# Patient Record
Sex: MALE | Race: WHITE | NOT HISPANIC OR LATINO | Employment: OTHER | URBAN - METROPOLITAN AREA
[De-identification: names, ages, dates, MRNs, and addresses within clinical notes are randomized per-mention and may not be internally consistent; named-entity substitution may affect disease eponyms.]

---

## 2023-12-29 ENCOUNTER — ANCILLARY PROCEDURE (OUTPATIENT)
Dept: RADIOLOGY | Facility: CLINIC | Age: 78
End: 2023-12-29
Payer: MEDICARE

## 2023-12-29 ENCOUNTER — OFFICE VISIT (OUTPATIENT)
Dept: PRIMARY CARE | Facility: CLINIC | Age: 78
End: 2023-12-29
Payer: MEDICARE

## 2023-12-29 VITALS
DIASTOLIC BLOOD PRESSURE: 78 MMHG | TEMPERATURE: 96.8 F | SYSTOLIC BLOOD PRESSURE: 119 MMHG | HEIGHT: 73 IN | WEIGHT: 197.2 LBS | BODY MASS INDEX: 26.14 KG/M2 | HEART RATE: 69 BPM

## 2023-12-29 DIAGNOSIS — H81.03 MENIERE'S DISEASE OF BOTH EARS: Primary | ICD-10-CM

## 2023-12-29 DIAGNOSIS — E78.2 MIXED HYPERLIPIDEMIA: ICD-10-CM

## 2023-12-29 DIAGNOSIS — L20.84 INTRINSIC ECZEMA: ICD-10-CM

## 2023-12-29 DIAGNOSIS — R73.9 HYPERGLYCEMIA: ICD-10-CM

## 2023-12-29 DIAGNOSIS — M25.511 RIGHT ANTERIOR SHOULDER PAIN: ICD-10-CM

## 2023-12-29 DIAGNOSIS — M25.511 ARTHRALGIA OF RIGHT ACROMIOCLAVICULAR JOINT: ICD-10-CM

## 2023-12-29 DIAGNOSIS — Z12.5 SCREENING FOR PROSTATE CANCER: ICD-10-CM

## 2023-12-29 PROBLEM — E78.5 HYPERLIPIDEMIA: Status: ACTIVE | Noted: 2023-12-29

## 2023-12-29 PROBLEM — C44.321 SQUAMOUS CELL CARCINOMA OF SKIN OF NOSE: Status: RESOLVED | Noted: 2023-05-25 | Resolved: 2023-12-29

## 2023-12-29 PROBLEM — G47.00 INSOMNIA: Status: ACTIVE | Noted: 2023-12-29

## 2023-12-29 PROBLEM — N40.0 BPH (BENIGN PROSTATIC HYPERPLASIA): Status: ACTIVE | Noted: 2023-12-29

## 2023-12-29 PROBLEM — L30.9 ECZEMA: Status: ACTIVE | Noted: 2023-12-29

## 2023-12-29 PROBLEM — G47.33 OSA (OBSTRUCTIVE SLEEP APNEA): Status: ACTIVE | Noted: 2023-12-29

## 2023-12-29 PROBLEM — I10 BENIGN ESSENTIAL HTN: Status: ACTIVE | Noted: 2023-12-29

## 2023-12-29 PROBLEM — I10 BENIGN ESSENTIAL HTN: Status: RESOLVED | Noted: 2023-12-29 | Resolved: 2023-12-29

## 2023-12-29 PROBLEM — B02.9 SHINGLES: Status: RESOLVED | Noted: 2023-12-29 | Resolved: 2023-12-29

## 2023-12-29 PROBLEM — K21.9 ESOPHAGEAL REFLUX: Status: ACTIVE | Noted: 2023-12-29

## 2023-12-29 PROBLEM — F41.8 DEPRESSION WITH ANXIETY: Status: ACTIVE | Noted: 2023-12-29

## 2023-12-29 PROBLEM — L57.0 ACTINIC KERATOSIS: Status: RESOLVED | Noted: 2023-05-25 | Resolved: 2023-12-29

## 2023-12-29 PROBLEM — L85.3 XEROSIS CUTIS: Status: RESOLVED | Noted: 2023-05-25 | Resolved: 2023-12-29

## 2023-12-29 PROBLEM — M88.9 PAGET'S DISEASE OF BONE: Status: RESOLVED | Noted: 2017-09-14 | Resolved: 2023-12-29

## 2023-12-29 PROCEDURE — 1126F AMNT PAIN NOTED NONE PRSNT: CPT | Performed by: INTERNAL MEDICINE

## 2023-12-29 PROCEDURE — 73030 X-RAY EXAM OF SHOULDER: CPT | Mod: RIGHT SIDE | Performed by: RADIOLOGY

## 2023-12-29 PROCEDURE — 1036F TOBACCO NON-USER: CPT | Performed by: INTERNAL MEDICINE

## 2023-12-29 PROCEDURE — 99214 OFFICE O/P EST MOD 30 MIN: CPT | Performed by: INTERNAL MEDICINE

## 2023-12-29 PROCEDURE — 1160F RVW MEDS BY RX/DR IN RCRD: CPT | Performed by: INTERNAL MEDICINE

## 2023-12-29 PROCEDURE — 1159F MED LIST DOCD IN RCRD: CPT | Performed by: INTERNAL MEDICINE

## 2023-12-29 PROCEDURE — 73030 X-RAY EXAM OF SHOULDER: CPT | Mod: RT

## 2023-12-29 RX ORDER — HYDROCHLOROTHIAZIDE 12.5 MG/1
12.5 TABLET ORAL DAILY
Qty: 90 TABLET | Refills: 3 | Status: SHIPPED | OUTPATIENT
Start: 2023-12-29 | End: 2024-03-22 | Stop reason: SDUPTHER

## 2023-12-29 ASSESSMENT — ENCOUNTER SYMPTOMS
DEPRESSION: 0
SHORTNESS OF BREATH: 0
FEVER: 0
COUGH: 0
PALPITATIONS: 0
OCCASIONAL FEELINGS OF UNSTEADINESS: 0
LOSS OF SENSATION IN FEET: 0
POLYDIPSIA: 0
CHILLS: 0

## 2023-12-29 NOTE — PATIENT INSTRUCTIONS
Follow up in 6 months for your annual physical.     Complete blood work 1 week before physical, it is ordered today for that future visit.

## 2023-12-29 NOTE — LETTER
January 27, 2024     Bryson Orourke  [address] [facility] [id number]      Dear Mr. Orourke:    Below are the results from your recent visit:    Based on the patient's shoulder x-ray, we see no signs of joint space arthritis.  Based on this finding and his location and type of symptoms at our visit I do believe that he is having some AC joint anterior shoulder pain.  As we discussed I could feel crepitus within the AC joint on examination at our appointment.  A good option for treating this if he wishes to avoid pill form medication as a topical OTC anti-inflammatory called Voltaren gel that you can apply directly over the area 2-4 times daily as needed.  If he does not mind pill medication, I can send in a long-acting anti-inflammatory called meloxicam for daily use for 2 to 4 weeks that would hopefully resolve the matter.    Resulted Orders   XR shoulder right 2+ views    Narrative    Interpreted By:  Harinder Cox,   STUDY:  XR SHOULDER RIGHT 2+ VIEWS;  12/29/2023 11:46 am      INDICATION:  Signs/Symptoms:anterior r shoulder pain. AC joint crepitus.      COMPARISON:  None.      ACCESSION NUMBER(S):  [id number]      ORDERING CLINICIAN:  JUAN DIEGO PARTIDA      FINDINGS:  No acute fracture or dislocation. Joint spaces appear preserved.        Impression    No acute osseous findings of the right shoulder.          Signed by: Harinder Cox 12/30/2023 11:36 AM  Dictation workstation:   [id number]       If you have any questions or concerns, please don't hesitate to call.         Sincerely,        Dr. Juan Diego Partida

## 2024-01-02 NOTE — RESULT ENCOUNTER NOTE
Based on the patient's shoulder x-ray, we see no signs of joint space arthritis.  Based on this finding and his location and type of symptoms at our visit I do believe that he is having some AC joint anterior shoulder pain.  As we discussed I could feel crepitus within the AC joint on examination at our appointment.  A good option for treating this if he wishes to avoid pill form medication as a topical OTC anti-inflammatory called Voltaren gel that you can apply directly over the area 2-4 times daily as needed.  If he does not mind pill medication, I can send in a long-acting anti-inflammatory called meloxicam for daily use for 2 to 4 weeks that would hopefully resolve the matter

## 2024-01-08 ENCOUNTER — OFFICE VISIT (OUTPATIENT)
Dept: DERMATOLOGY | Facility: CLINIC | Age: 79
End: 2024-01-08
Payer: MEDICARE

## 2024-01-08 DIAGNOSIS — D48.5 NEOPLASM OF UNCERTAIN BEHAVIOR OF SKIN: Primary | ICD-10-CM

## 2024-01-08 DIAGNOSIS — L57.8 DIFFUSE PHOTODAMAGE OF SKIN: ICD-10-CM

## 2024-01-08 DIAGNOSIS — D22.5 MELANOCYTIC NEVUS OF TRUNK: ICD-10-CM

## 2024-01-08 DIAGNOSIS — L82.0 INFLAMED SEBORRHEIC KERATOSIS: ICD-10-CM

## 2024-01-08 DIAGNOSIS — Z85.828 HISTORY OF NONMELANOMA SKIN CANCER: ICD-10-CM

## 2024-01-08 DIAGNOSIS — L82.1 SEBORRHEIC KERATOSIS: ICD-10-CM

## 2024-01-08 DIAGNOSIS — L57.0 ACTINIC KERATOSIS: ICD-10-CM

## 2024-01-08 DIAGNOSIS — L21.9 SEBORRHEIC DERMATITIS: ICD-10-CM

## 2024-01-08 PROCEDURE — 88305 TISSUE EXAM BY PATHOLOGIST: CPT | Performed by: DERMATOLOGY

## 2024-01-08 PROCEDURE — 17004 DESTROY PREMAL LESIONS 15/>: CPT | Performed by: DERMATOLOGY

## 2024-01-08 PROCEDURE — 11301 SHAVE SKIN LESION 0.6-1.0 CM: CPT | Performed by: DERMATOLOGY

## 2024-01-08 PROCEDURE — 1159F MED LIST DOCD IN RCRD: CPT | Performed by: DERMATOLOGY

## 2024-01-08 PROCEDURE — 88305 TISSUE EXAM BY PATHOLOGIST: CPT | Mod: TC,DER | Performed by: DERMATOLOGY

## 2024-01-08 PROCEDURE — 1036F TOBACCO NON-USER: CPT | Performed by: DERMATOLOGY

## 2024-01-08 PROCEDURE — 99214 OFFICE O/P EST MOD 30 MIN: CPT | Performed by: DERMATOLOGY

## 2024-01-08 PROCEDURE — 17110 DESTRUCTION B9 LES UP TO 14: CPT | Performed by: DERMATOLOGY

## 2024-01-08 PROCEDURE — 1126F AMNT PAIN NOTED NONE PRSNT: CPT | Performed by: DERMATOLOGY

## 2024-01-08 ASSESSMENT — DERMATOLOGY QUALITY OF LIFE (QOL) ASSESSMENT: ARE THERE EXCLUSIONS OR EXCEPTIONS FOR THE QUALITY OF LIFE ASSESSMENT: NO

## 2024-01-08 ASSESSMENT — DERMATOLOGY PATIENT ASSESSMENT
ARE YOU AN ORGAN TRANSPLANT RECIPIENT: NO
DO YOU USE SUNSCREEN: OCCASIONALLY
DO YOU USE A TANNING BED: NO
DO YOU HAVE ANY NEW OR CHANGING LESIONS: NO

## 2024-01-08 ASSESSMENT — ITCH NUMERIC RATING SCALE: HOW SEVERE IS YOUR ITCHING?: 0

## 2024-01-08 NOTE — PROGRESS NOTES
Subjective     Bryson Orourke is a 78 y.o. male who presents for the following: Skin Check.  He notes a raised, dry, scaly, itchy bump on his upper back, which has not changed in any other way, including in size or color, and does not hurt or bleed, but has been itching a lot recently.  He also notes very dry skin on his face.  He denies any other new, changing, or concerning skin lesions since his last visit; no bleeding, itching, or burning lesions.      Review of Systems:  No other skin or systemic complaints other than what is documented elsewhere in the note.    The following portions of the chart were reviewed this encounter and updated as appropriate:       Skin Cancer History  No skin cancer on file.    Specialty Problems          Dermatology Problems    Eczema       Past Dermatologic / Past Relevant Medical History:    - history of several nonmelanoma skin cancers, including most recently BCC on right lateral distal arm diagnosed on 10/4/21 s/p ED&C on 11/11/21  - numerous AKs s/p course of topical field therapy with Efudex 5% cream on his face and scalp completed in December 2019  - Tinea pedis  - allergic contact dermatitis to poison ivy with auto-eczematization  - no h/o melanoma    Family History:    Sister - nonmelanoma skin cancer  No family history of melanoma    Social History:    The patient works as a  and cabrera and draws on Fridays    Allergies:  Acyclovir, Diphenhydramine, Famciclovir, Penicillin, Penicillins, and Prednisone    Current Medications / CAM's:    Current Outpatient Medications:     hydroCHLOROthiazide (HYDRODiuril) 12.5 mg tablet, Take 1 tablet (12.5 mg) by mouth once daily., Disp: 90 tablet, Rfl: 3     Objective   Well appearing patient in no apparent distress; mood and affect are within normal limits.    A waist-up examination was performed including scalp, face, eyes, ears, nose, lips, neck, chest, axillae, abdomen, back, and bilateral upper extremities. All findings  within normal limits unless otherwise noted below.        Assessment/Plan   1. Neoplasm of uncertain behavior of skin  Right Lateral Dorsal Wrist  8 mm dark brown pigmented, asymmetric macule with an asymmetric pigment network and irregular borders           Shave removal    Lesion length (cm):  0.8  Margin per side (cm):  0  Lesion diameter (cm):  0.8  Informed consent: discussed and consent obtained    Timeout: patient name, date of birth, surgical site, and procedure verified    Procedure prep:  Patient was prepped and draped  Anesthesia: the lesion was anesthetized in a standard fashion    Anesthetic:  1% lidocaine w/ epinephrine 1-100,000 local infiltration  Instrument used: flexible razor blade    Hemostasis achieved with: aluminum chloride    Outcome: patient tolerated procedure well    Post-procedure details: sterile dressing applied and wound care instructions given    Dressing type: bandage and petrolatum      Specimen 1 - Dermatopathology- DERM LAB  Differential Diagnosis: lentigo v AMH v SCCIS  Check Margins Yes/No?:    Comments:    Dermpath Lab: Routine Histopathology (formalin-fixed tissue)    2. Inflamed seborrheic keratosis  Mid Back  On the patient's mid upper back, there is a 1 cm erythematous and light brown-colored, hyperkeratotic, stuck-on appearing papule with a surrounding rim of erythema    Inflamed Seborrheic Keratosis -mid upper back.  The benign nature of this lesion was discussed with the patient today and reassurance provided.  Given the history the patient provides of frequent irritation and associated symptoms as well as its inflamed appearance on exam today, I offered to treat this lesion with liquid nitrogen cryotherapy.  The patient expressed understanding, is in agreement with this plan, and wishes to proceed with cryotherapy today.    Destr of lesion - Mid Back  Complexity: simple    Destruction method: cryotherapy    Informed consent: discussed and consent obtained    Lesion  destroyed using liquid nitrogen: Yes    Cryotherapy cycles:  2  Outcome: patient tolerated procedure well with no complications    Post-procedure details: wound care instructions given      3. Actinic keratosis (20)  On the patient's left lateral neck, there is a pink, well-healed scar at the recent biopsy site with a surrounding rim of erythema, grittiness, and scale; scattered on the patient's left and right lateral neck and face, there are multiple erythematous, gritty, scaly macules    Biopsy-proven Actinic Keratosis and additional AKs - left lateral neck, present on the deep and peripheral margins, and scattered on left and right lateral neck and face, respectively.  The pre-cancerous nature of these lesions and treatment options were discussed with the patient today.  At this time, I recommend treatment with liquid nitrogen cryotherapy.  The patient expressed understanding, is in agreement with this plan, and wishes to proceed with cryotherapy today.    Destr of lesion  Complexity: simple    Destruction method: cryotherapy    Informed consent: discussed and consent obtained    Lesion destroyed using liquid nitrogen: Yes    Cryotherapy cycles:  1  Outcome: patient tolerated procedure well with no complications    Post-procedure details: wound care instructions given      4. Melanocytic nevus of trunk  Scattered on the patient's face, neck, trunk, and bilateral upper extremities, there are several small, round- to oval-shaped, brown-pigmented and pink-colored, symmetric, uniform-appearing macules and dome-shaped papules    Clinically benign- to slightly atypical-appearing nevi - the clinically benign- to slightly atypical-appearing nature of the remainder of the patient's nevi was discussed with the patient today.  None of the patient's nevi, with the exception of the one noted above, meet threshold for biopsy today.  I emphasized the importance of performing monthly self-skin exams using the ABCDs of monitoring  moles, which were reviewed with the patient today and an informational hand-out provided.  I also emphasized the importance of sun avoidance and sun protection with daily sunscreen use.    5. Seborrheic keratosis  Scattered on the patient's face, neck, trunk, and bilateral upper extremities, there are multiple tan- to light brown-colored, hyperkeratotic, stuck-on appearing papules of varying size and shape    Seborrheic Keratoses - the benign nature of these lesions was discussed with the patient today and reassurance provided.  No treatment is medically indicated for the noninflamed SKs at this time.    6. Seborrheic dermatitis  Head - Anterior (Face)  On the patient's face, mainly the glabella and bilateral eyebrows and perinasal creases, there are pink, scaly patches with whitish-yellowish, greasy scale    Seborrheic Dermatitis - flare on face.  The potentially chronic and intermittently flaring nature of this condition and treatment options were discussed extensively with the patient today.  At this time, I recommend topical anti-fungal therapy with Ketoconazole 2% cream, which the patient was instructed to apply twice daily to the affected areas of the face.  The risks, benefits, and side effects of this medication were discussed.  The patient expressed understanding and is in agreement with this plan.    7. History of nonmelanoma skin cancer  On the patient's right lateral distal arm, right lateral cheek posterior, left posterior temple, right upper forehead, and right upper cheek, there are well-healed scars with no evidence of recurrent growth on exam today.    History of nonmelanoma skin cancers and actinic keratoses and photodamage.  There is no evidence of recurrence at the sites of the patient's previously treated NMSCs on exam today.  The signs and symptoms of skin cancer were reviewed and the patient was advised to practice sun protection and sun avoidance, use daily sunscreen, and perform regular self  skin exams.  I will have the patient return to our office in 3 to 4 months, pending the above biopsy result, for routine follow-up and skin exam, and the patient was instructed to call our office should the patient notice any new, changing, symptomatic, or otherwise concerning skin lesions before then.  The patient expressed understanding and is in agreement with this plan.    8. Diffuse photodamage of skin  Photodistributed  Diffuse photodamage with actinic changes with telangiectasia and mottled pigmentation in sun-exposed areas.    Photodamage.  The signs and symptoms of skin cancer were reviewed and the patient was advised to practice sun protection and sun avoidance, use daily sunscreen, and perform regular self skin exams.  Sun protection was discussed, including avoiding the mid-day sun, wearing a sunscreen with SPF at least 50, and stressing the need for reapplication of sunscreen and applying more than they think they need.

## 2024-01-10 LAB
LABORATORY COMMENT REPORT: NORMAL
PATH REPORT.FINAL DX SPEC: NORMAL
PATH REPORT.GROSS SPEC: NORMAL
PATH REPORT.MICROSCOPIC SPEC OTHER STN: NORMAL
PATH REPORT.RELEVANT HX SPEC: NORMAL
PATH REPORT.TOTAL CANCER: NORMAL

## 2024-01-27 ENCOUNTER — TELEPHONE (OUTPATIENT)
Dept: PRIMARY CARE | Facility: CLINIC | Age: 79
End: 2024-01-27

## 2024-03-22 ENCOUNTER — OFFICE VISIT (OUTPATIENT)
Dept: PRIMARY CARE | Facility: CLINIC | Age: 79
End: 2024-03-22
Payer: MEDICARE

## 2024-03-22 VITALS
DIASTOLIC BLOOD PRESSURE: 74 MMHG | TEMPERATURE: 96.8 F | HEART RATE: 76 BPM | WEIGHT: 195 LBS | BODY MASS INDEX: 25.73 KG/M2 | SYSTOLIC BLOOD PRESSURE: 132 MMHG

## 2024-03-22 DIAGNOSIS — E78.5 DYSLIPIDEMIA: ICD-10-CM

## 2024-03-22 DIAGNOSIS — M15.9 PRIMARY OSTEOARTHRITIS INVOLVING MULTIPLE JOINTS: ICD-10-CM

## 2024-03-22 DIAGNOSIS — H81.03 MENIERE'S DISEASE OF BOTH EARS: Primary | ICD-10-CM

## 2024-03-22 DIAGNOSIS — M77.8 RIGHT SHOULDER TENDONITIS: ICD-10-CM

## 2024-03-22 DIAGNOSIS — G25.0 BENIGN ESSENTIAL TREMOR: ICD-10-CM

## 2024-03-22 PROCEDURE — G2211 COMPLEX E/M VISIT ADD ON: HCPCS | Performed by: INTERNAL MEDICINE

## 2024-03-22 PROCEDURE — 1160F RVW MEDS BY RX/DR IN RCRD: CPT | Performed by: INTERNAL MEDICINE

## 2024-03-22 PROCEDURE — 99214 OFFICE O/P EST MOD 30 MIN: CPT | Performed by: INTERNAL MEDICINE

## 2024-03-22 PROCEDURE — 1036F TOBACCO NON-USER: CPT | Performed by: INTERNAL MEDICINE

## 2024-03-22 PROCEDURE — 1159F MED LIST DOCD IN RCRD: CPT | Performed by: INTERNAL MEDICINE

## 2024-03-22 RX ORDER — HYDROCHLOROTHIAZIDE 12.5 MG/1
12.5 TABLET ORAL DAILY
Qty: 100 TABLET | Refills: 3 | Status: SHIPPED | OUTPATIENT
Start: 2024-03-22

## 2024-03-22 RX ORDER — ATORVASTATIN CALCIUM 10 MG/1
10 TABLET, FILM COATED ORAL DAILY
Qty: 100 TABLET | Refills: 3 | Status: SHIPPED | OUTPATIENT
Start: 2024-03-22 | End: 2025-04-26

## 2024-03-22 ASSESSMENT — ENCOUNTER SYMPTOMS
SHORTNESS OF BREATH: 0
COUGH: 0
PALPITATIONS: 0
CHILLS: 0
POLYDIPSIA: 0
FEVER: 0

## 2024-03-22 NOTE — PROGRESS NOTES
Subjective   Patient ID: Bryson Orourke is a 79 y.o. male who presents for No chief complaint on file..    79-year-old male presents for follow-up on multiple issues.  He scheduled this appointment recently after a home check-in from his insurance company and the nurse.  At that time they discovered he was not taking any of his medications as previously recommended he had a mildly high blood pressure and it was noted that he has high cholesterol as we have previously discussed.  The patient has been and team medication for some time having taken himself off medication and never actually starting the hydrochlorothiazide as we previously discussed.  He was again educated today about the benefits of these medications for managing his variable blood pressures, chronic Ménière's disease, and chronic elevated lipids with a cholesterol ratio of 5.0.  At the conclusion of this discussion the patient was agreeable to initiating medications as previously recommended.  He is bothered by the Ménière's disease by the presence of chronic tinnitus as we discussed today that it may be of benefit to use this medication for that management.  I have advised him to complete blood work a few weeks after starting all medications and to see me back at his usual physical as it is currently scheduled.  He can contact me sooner on an as-needed basis should the need arise.    In addition to the above, he reports some chronic shoulder pain on the right side with a negative x-ray back in December.  I advised him about the recommendation for physical therapy which was ordered and provided to him today.  He also has diffuse multijoint arthritis which was originally suspicion for his shoulders for improvement being negative.  Primarily the lower extremities especially in the knee.  This makes it difficult for him to exercise with weightbearing exercise but is requesting to consider water-based physical therapy which I provided him information and  orders for at this time.         Review of Systems   Constitutional:  Negative for chills and fever.   Respiratory:  Negative for cough and shortness of breath.    Cardiovascular:  Negative for chest pain and palpitations.   Endocrine: Negative for polydipsia and polyuria.       Objective   /74   Pulse 76   Temp 36 °C (96.8 °F)   Wt 88.5 kg (195 lb)   BMI 25.73 kg/m²     Physical Exam  Constitutional:       Appearance: Normal appearance.   HENT:      Head: Normocephalic and atraumatic.   Eyes:      Extraocular Movements: Extraocular movements intact.      Pupils: Pupils are equal, round, and reactive to light.   Neck:      Thyroid: No thyroid mass or thyromegaly.      Vascular: No carotid bruit.   Cardiovascular:      Rate and Rhythm: Normal rate and regular rhythm.      Heart sounds: No murmur heard.     No friction rub. No gallop.   Pulmonary:      Effort: No respiratory distress.      Breath sounds: No wheezing, rhonchi or rales.   Musculoskeletal:      Cervical back: Neck supple.      Right lower leg: No edema.      Left lower leg: No edema.   Lymphadenopathy:      Cervical: No cervical adenopathy.   Neurological:      Mental Status: He is alert.         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

## 2024-03-25 ENCOUNTER — APPOINTMENT (OUTPATIENT)
Dept: PRIMARY CARE | Facility: CLINIC | Age: 79
End: 2024-03-25
Payer: MEDICARE
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
## 2024-04-24 ENCOUNTER — EVALUATION (OUTPATIENT)
Dept: PHYSICAL THERAPY | Facility: CLINIC | Age: 79
End: 2024-04-24
Payer: MEDICARE

## 2024-04-24 DIAGNOSIS — G89.29 CHRONIC RIGHT SHOULDER PAIN: Primary | ICD-10-CM

## 2024-04-24 DIAGNOSIS — M54.2 NECK PAIN: ICD-10-CM

## 2024-04-24 DIAGNOSIS — M15.9 PRIMARY OSTEOARTHRITIS INVOLVING MULTIPLE JOINTS: ICD-10-CM

## 2024-04-24 DIAGNOSIS — M25.511 CHRONIC RIGHT SHOULDER PAIN: Primary | ICD-10-CM

## 2024-04-24 PROBLEM — M15.0 PRIMARY OSTEOARTHRITIS INVOLVING MULTIPLE JOINTS: Status: ACTIVE | Noted: 2024-04-24

## 2024-04-24 PROCEDURE — 97110 THERAPEUTIC EXERCISES: CPT | Mod: GP | Performed by: PHYSICAL THERAPIST

## 2024-04-24 PROCEDURE — 97161 PT EVAL LOW COMPLEX 20 MIN: CPT | Mod: GP | Performed by: PHYSICAL THERAPIST

## 2024-04-24 ASSESSMENT — ENCOUNTER SYMPTOMS
DEPRESSION: 0
LOSS OF SENSATION IN FEET: 0
OCCASIONAL FEELINGS OF UNSTEADINESS: 0

## 2024-04-29 ENCOUNTER — LAB (OUTPATIENT)
Dept: LAB | Facility: LAB | Age: 79
End: 2024-04-29
Payer: MEDICARE

## 2024-04-29 DIAGNOSIS — R73.9 HYPERGLYCEMIA: ICD-10-CM

## 2024-04-29 DIAGNOSIS — Z12.5 SCREENING FOR PROSTATE CANCER: ICD-10-CM

## 2024-04-29 DIAGNOSIS — H81.03 MENIERE'S DISEASE OF BOTH EARS: ICD-10-CM

## 2024-04-29 DIAGNOSIS — E78.2 MIXED HYPERLIPIDEMIA: ICD-10-CM

## 2024-04-29 LAB
ALBUMIN SERPL BCP-MCNC: 4.2 G/DL (ref 3.4–5)
ALP SERPL-CCNC: 98 U/L (ref 33–136)
ALT SERPL W P-5'-P-CCNC: 18 U/L (ref 10–52)
ANION GAP SERPL CALC-SCNC: 12 MMOL/L (ref 10–20)
AST SERPL W P-5'-P-CCNC: 18 U/L (ref 9–39)
BILIRUB SERPL-MCNC: 0.4 MG/DL (ref 0–1.2)
BUN SERPL-MCNC: 18 MG/DL (ref 6–23)
CALCIUM SERPL-MCNC: 9.4 MG/DL (ref 8.6–10.6)
CHLORIDE SERPL-SCNC: 102 MMOL/L (ref 98–107)
CHOLEST SERPL-MCNC: 163 MG/DL (ref 0–199)
CHOLESTEROL/HDL RATIO: 3.5
CO2 SERPL-SCNC: 31 MMOL/L (ref 21–32)
CREAT SERPL-MCNC: 1.06 MG/DL (ref 0.5–1.3)
CREAT UR-MCNC: 127.3 MG/DL (ref 20–370)
EGFRCR SERPLBLD CKD-EPI 2021: 72 ML/MIN/1.73M*2
ERYTHROCYTE [DISTWIDTH] IN BLOOD BY AUTOMATED COUNT: 12.1 % (ref 11.5–14.5)
EST. AVERAGE GLUCOSE BLD GHB EST-MCNC: 105 MG/DL
GLUCOSE SERPL-MCNC: 90 MG/DL (ref 74–99)
HBA1C MFR BLD: 5.3 %
HCT VFR BLD AUTO: 40.1 % (ref 41–52)
HDLC SERPL-MCNC: 46 MG/DL
HGB BLD-MCNC: 13.6 G/DL (ref 13.5–17.5)
LDLC SERPL CALC-MCNC: 100 MG/DL
MCH RBC QN AUTO: 30.8 PG (ref 26–34)
MCHC RBC AUTO-ENTMCNC: 33.9 G/DL (ref 32–36)
MCV RBC AUTO: 91 FL (ref 80–100)
MICROALBUMIN UR-MCNC: <7 MG/L
MICROALBUMIN/CREAT UR: NORMAL MG/G{CREAT}
NON HDL CHOLESTEROL: 117 MG/DL (ref 0–149)
NRBC BLD-RTO: 0 /100 WBCS (ref 0–0)
PLATELET # BLD AUTO: 279 X10*3/UL (ref 150–450)
POTASSIUM SERPL-SCNC: 4.3 MMOL/L (ref 3.5–5.3)
PROT SERPL-MCNC: 7.2 G/DL (ref 6.4–8.2)
PSA SERPL-MCNC: 1.78 NG/ML
RBC # BLD AUTO: 4.42 X10*6/UL (ref 4.5–5.9)
SODIUM SERPL-SCNC: 141 MMOL/L (ref 136–145)
TRIGL SERPL-MCNC: 86 MG/DL (ref 0–149)
VLDL: 17 MG/DL (ref 0–40)
WBC # BLD AUTO: 6.4 X10*3/UL (ref 4.4–11.3)

## 2024-04-29 PROCEDURE — 80061 LIPID PANEL: CPT

## 2024-04-29 PROCEDURE — 36415 COLL VENOUS BLD VENIPUNCTURE: CPT

## 2024-04-29 PROCEDURE — 82043 UR ALBUMIN QUANTITATIVE: CPT

## 2024-04-29 PROCEDURE — G0103 PSA SCREENING: HCPCS

## 2024-04-29 PROCEDURE — 83036 HEMOGLOBIN GLYCOSYLATED A1C: CPT

## 2024-04-29 PROCEDURE — 82570 ASSAY OF URINE CREATININE: CPT

## 2024-04-29 PROCEDURE — 85027 COMPLETE CBC AUTOMATED: CPT

## 2024-04-29 PROCEDURE — 80053 COMPREHEN METABOLIC PANEL: CPT

## 2024-05-01 ENCOUNTER — TREATMENT (OUTPATIENT)
Dept: PHYSICAL THERAPY | Facility: CLINIC | Age: 79
End: 2024-05-01
Payer: MEDICARE

## 2024-05-01 DIAGNOSIS — M15.9 PRIMARY OSTEOARTHRITIS INVOLVING MULTIPLE JOINTS: Primary | ICD-10-CM

## 2024-05-01 DIAGNOSIS — G89.29 CHRONIC RIGHT SHOULDER PAIN: ICD-10-CM

## 2024-05-01 DIAGNOSIS — M54.2 NECK PAIN: ICD-10-CM

## 2024-05-01 DIAGNOSIS — M25.511 CHRONIC RIGHT SHOULDER PAIN: ICD-10-CM

## 2024-05-01 PROCEDURE — 97110 THERAPEUTIC EXERCISES: CPT | Mod: GP | Performed by: PHYSICAL THERAPIST

## 2024-05-01 NOTE — PROGRESS NOTES
"Physical Therapy  Physical Therapy Treatment    Patient Name: Bryson Orourke  MRN: [id number]  Today's Date: 5/1/2024  Time Calculation  Start Time: 1130  Stop Time: 1210  Time Calculation (min): 40 min  Visit 2 Reason for visit: Chronic right shoulder pain, OA multiple sites   Referring MD: Juan Diego Jung  Insurance: [facility] medicare, [place], $20 co pay, no auth  DX: oa multiple sites, chronic right shd pain, neck pain   Frequency: 1 /week   Duration: 6-8 weeks  Certification Period Start Date: 04/24/24  Certification Period End Date: 07/23/24      Current Problem  1. Primary osteoarthritis involving multiple joints        2. Chronic right shoulder pain        3. Neck pain            General  Reason for Referral: right shoulder pain, neck pain  Referred By: Juan Diego Partida  Past Medical History Relevant to Rehab: Meniere's, HLD, hearing trouble,  Precautions     Pain       Subjective:   Subjective   Patient reports I think I ache a little more neck and shoulder 5/10.  I have been doing my exercises and maybe I am doing them too hard.      Objective:   Objective   Observation/Posture: fwd head min rounded shoulders     Treatments:  UBE L3 x5'   Seated Cervical Retraction 5\" x10   Supine retraction with towel roll 5\" x10   Cervical Rotation AROM 5\"x10   Cervical Sidebending AROM 5\"x10   Cane flexion 5\" x15  Cane abd 5\" x15   Cane ext 5\" x15   Scap sets   Rows GTB x20 (Added to HEP)    JULIETTE shd extension GTB 5\" x20 (Added to HEP)          Access Code: [email or web address] shoulder neck   Access Code: [email or web address] at home foot / toe   Assessment:    Patient reported better tolerance to exercises with less discomfort if not pushing too far.  One episode of right shoulder pain with cane abduction, tolerated scaption better.       Plan:    Continue per POC to increase ROM, strength and stability neck, shoulder, and scapula to allow return to pain free adl's.        Paul Mahajan, PT  "

## 2024-05-09 ENCOUNTER — OFFICE VISIT (OUTPATIENT)
Dept: DERMATOLOGY | Facility: CLINIC | Age: 79
End: 2024-05-09
Payer: MEDICARE

## 2024-05-09 DIAGNOSIS — D18.01 HEMANGIOMA OF SKIN: ICD-10-CM

## 2024-05-09 DIAGNOSIS — L82.1 SEBORRHEIC KERATOSIS: ICD-10-CM

## 2024-05-09 DIAGNOSIS — L57.0 ACTINIC KERATOSIS: ICD-10-CM

## 2024-05-09 DIAGNOSIS — D48.5 NEOPLASM OF UNCERTAIN BEHAVIOR OF SKIN: Primary | ICD-10-CM

## 2024-05-09 DIAGNOSIS — Z85.828 HISTORY OF NONMELANOMA SKIN CANCER: ICD-10-CM

## 2024-05-09 DIAGNOSIS — L73.9 FOLLICULITIS: ICD-10-CM

## 2024-05-09 DIAGNOSIS — L57.8 DIFFUSE PHOTODAMAGE OF SKIN: ICD-10-CM

## 2024-05-09 DIAGNOSIS — D22.5 MELANOCYTIC NEVUS OF TRUNK: ICD-10-CM

## 2024-05-09 PROCEDURE — 17004 DESTROY PREMAL LESIONS 15/>: CPT | Performed by: DERMATOLOGY

## 2024-05-09 PROCEDURE — 11307 SHAVE SKIN LESION 1.1-2.0 CM: CPT | Performed by: DERMATOLOGY

## 2024-05-09 PROCEDURE — 1159F MED LIST DOCD IN RCRD: CPT | Performed by: DERMATOLOGY

## 2024-05-09 PROCEDURE — 99214 OFFICE O/P EST MOD 30 MIN: CPT | Performed by: DERMATOLOGY

## 2024-05-09 PROCEDURE — 1160F RVW MEDS BY RX/DR IN RCRD: CPT | Performed by: DERMATOLOGY

## 2024-05-09 ASSESSMENT — DERMATOLOGY QUALITY OF LIFE (QOL) ASSESSMENT: ARE THERE EXCLUSIONS OR EXCEPTIONS FOR THE QUALITY OF LIFE ASSESSMENT: NO

## 2024-05-09 ASSESSMENT — DERMATOLOGY PATIENT ASSESSMENT
DO YOU USE A TANNING BED: NO
DO YOU USE SUNSCREEN: OCCASIONALLY
DO YOU HAVE ANY NEW OR CHANGING LESIONS: NO

## 2024-05-09 NOTE — PROGRESS NOTES
Subjective     Bryson Orourke is a 79 y.o. male who presents for the following: Skin Exam.  He notes a new scaly bump on his left hand, which has been hurting recently.  He also notes occasional pimple breakouts on his neck.  He denies any other new, changing, or concerning skin lesions since his last visit; no bleeding, itching, or burning lesions.      Review of Systems:  No other skin or systemic complaints other than what is documented elsewhere in the note.    The following portions of the chart were reviewed this encounter and updated as appropriate:       Skin Cancer History  No skin cancer on file.    Specialty Problems          Dermatology Problems    Eczema       Past Dermatologic / Past Relevant Medical History:    - history of several nonmelanoma skin cancers, including most recently BCC on right lateral distal arm diagnosed on 10/4/21 s/p ED&C on 11/11/21  - numerous AKs s/p course of topical field therapy with Efudex 5% cream on his face and scalp completed in December 2019  - Tinea pedis  - allergic contact dermatitis to poison ivy with auto-eczematization  - seborrheic dermatitis  - no h/o melanoma    Family History:    Sister - nonmelanoma skin cancer  No family history of melanoma    Social History:    The patient works as a  and cabrera and draws on Fridays    Allergies:  Acyclovir, Diphenhydramine, Famciclovir, Penicillin, Penicillins, and Prednisone    Current Medications / CAM's:    Current Outpatient Medications:     atorvastatin (Lipitor) 10 mg tablet, Take 1 tablet (10 mg) by mouth once daily., Disp: 100 tablet, Rfl: 3    hydroCHLOROthiazide (Microzide) 12.5 mg tablet, Take 1 tablet (12.5 mg) by mouth once daily., Disp: 100 tablet, Rfl: 3     Objective   Well appearing patient in no apparent distress; mood and affect are within normal limits.    A full examination was performed including scalp, face, eyes, ears, nose, lips, neck, chest, axillae, abdomen, back, bilateral upper  extremities, and bilateral lower extremities. All findings within normal limits unless otherwise noted below.    Assessment/Plan   1. Neoplasm of uncertain behavior of skin  Left Lateral Proximal Dorsal Hand  1.1 cm erythematous, scaly, tender plaque          Shave removal    Lesion length (cm):  1.1  Margin per side (cm):  0  Lesion diameter (cm):  1.1  Informed consent: discussed and consent obtained    Timeout: patient name, date of birth, surgical site, and procedure verified    Procedure prep:  Patient was prepped and draped  Anesthesia: the lesion was anesthetized in a standard fashion    Anesthetic:  1% lidocaine w/ epinephrine 1-100,000 local infiltration  Instrument used: flexible razor blade    Hemostasis achieved with: aluminum chloride    Outcome: patient tolerated procedure well    Post-procedure details: sterile dressing applied and wound care instructions given    Dressing type: bandage and petrolatum      Staff Communication: Dermatology Local Anesthesia: 1 % Lidocaine / Epinephrine - Amount:0.5ml    Specimen 1 - Dermatopathology- DERM LAB  Differential Diagnosis: SCC v HAK v SK  Check Margins Yes/No?:    Comments:    Dermpath Lab: Routine Histopathology (formalin-fixed tissue)    2. Actinic keratosis (24)  Head - Anterior (Face) (24)  Scattered on the patient's face and bilateral dorsal hands, there are multiple erythematous, gritty, scaly macules     Actinic Keratoses -scattered on face and bilateral dorsal hands.  The pre-cancerous nature of these lesions and treatment options were discussed with the patient today.  At this time, I recommend treatment with liquid nitrogen cryotherapy.  The patient expressed understanding, is in agreement with this plan, and wishes to proceed with cryotherapy today.    Destr of lesion - Head - Anterior (Face)  Complexity: simple    Destruction method: cryotherapy    Informed consent: discussed and consent obtained    Lesion destroyed using liquid nitrogen: Yes     Cryotherapy cycles:  1  Outcome: patient tolerated procedure well with no complications    Post-procedure details: wound care instructions given      3. Melanocytic nevus of trunk  Scattered on the patient's face, neck, trunk, and extremities, there are several small, round- to oval-shaped, brown-pigmented and pink-colored, symmetric, uniform-appearing macules and dome-shaped papules    Clinically benign- to slightly atypical-appearing nevi - the clinically benign- to slightly atypical-appearing nature of the patient's nevi was discussed with the patient today.  None of the patient's nevi meet threshold for biopsy today.  I emphasized the importance of performing monthly self-skin exams using the ABCDs of monitoring moles, which were reviewed with the patient today and an informational hand-out provided.  I also emphasized the importance of sun avoidance and sun protection with daily sunscreen use.  The patient expressed understanding and is in agreement with this plan.    4. Seborrheic keratosis  Scattered on the patient's face, neck, trunk, and extremities, there are multiple tan- to light brown-colored, hyperkeratotic, stuck-on appearing papules of varying size and shape    Seborrheic Keratoses - the benign nature of these lesions was discussed with the patient today and reassurance provided.  No treatment is medically indicated for these lesions at this time.    5. Hemangioma of skin  Scattered on the patient's face, neck, trunk, and extremities, there are multiple small, round, cherry red- to purplish-colored, symmetric, uniform, vascular-appearing macules and papules    Cherry Angiomas - the benign nature of these vascular lesions was discussed with the patient today and reassurance provided.  No treatment is medically indicated for these lesions at this time.    6. Folliculitis  Neck - Anterior  Scattered on the patient's neck, there are several follicular-based erythematous, inflammatory papules and  pustules    Folliculitis -neck.  The bacterial nature of this condition and treatment options were discussed with the patient today.  At this time, I recommend topical antibiotic therapy with Clindamycin 1% lotion, which the patient was instructed to apply twice daily to the affected areas or up to 3-4 times per day as needed for active lesions.  The risks, benefits, and side effects of this medication were discussed.  The patient expressed understanding and is in agreement with this plan.    7. History of nonmelanoma skin cancer  On the patient's right lateral distal arm and scattered on his face, neck, trunk, and extremities, there are well-healed scars with no evidence of recurrent growth on exam today.    History of nonmelanoma skin cancers and actinic keratoses and photodamage.  There is no evidence of recurrence at the sites of the patient's previously treated NMSCs on exam today.  The signs and symptoms of skin cancer were reviewed and the patient was advised to practice sun protection and sun avoidance, use daily sunscreen, and perform regular self skin exams.  I will have the patient return to our office in 3 to 4 months, pending the above biopsy result, for routine follow-up and skin exam, and the patient was instructed to call our office should the patient notice any new, changing, symptomatic, or otherwise concerning skin lesions before then.  The patient expressed understanding and is in agreement with this plan.    8. Diffuse photodamage of skin  Photodistributed  Diffuse photodamage with actinic changes with telangiectasia and mottled pigmentation in sun-exposed areas.    Photodamage.  The signs and symptoms of skin cancer were reviewed and the patient was advised to practice sun protection and sun avoidance, use daily sunscreen, and perform regular self skin exams.  Sun protection was discussed, including avoiding the mid-day sun, wearing a sunscreen with SPF at least 50, and stressing the need for  reapplication of sunscreen and applying more than they think they need.

## 2024-05-10 ENCOUNTER — DOCUMENTATION (OUTPATIENT)
Dept: PHYSICAL THERAPY | Facility: CLINIC | Age: 79
End: 2024-05-10

## 2024-05-10 NOTE — PROGRESS NOTES
Physical Therapy                 Therapy Communication Note    Patient Name: Bryson Orourke  MRN: [id number]  Today's Date: 5/10/2024     Discipline: Physical Therapy    Missed Visit Reason:      Missed Time: No Show    Comment:

## 2024-05-17 ENCOUNTER — APPOINTMENT (OUTPATIENT)
Dept: PHYSICAL THERAPY | Facility: CLINIC | Age: 79
End: 2024-05-17
Payer: MEDICARE

## 2024-05-22 ENCOUNTER — PROCEDURE VISIT (OUTPATIENT)
Dept: DERMATOLOGY | Facility: CLINIC | Age: 79
End: 2024-05-22
Payer: MEDICARE

## 2024-05-22 VITALS — HEART RATE: 67 BPM | DIASTOLIC BLOOD PRESSURE: 78 MMHG | SYSTOLIC BLOOD PRESSURE: 126 MMHG

## 2024-05-22 DIAGNOSIS — C44.629 SQUAMOUS CELL CANCER OF SKIN OF LEFT HAND: Primary | ICD-10-CM

## 2024-05-22 PROCEDURE — 99214 OFFICE O/P EST MOD 30 MIN: CPT | Performed by: DERMATOLOGY

## 2024-05-22 PROCEDURE — 17311 MOHS 1 STAGE H/N/HF/G: CPT | Performed by: DERMATOLOGY

## 2024-05-22 NOTE — PROGRESS NOTES
Office Visit Note  Date: 5/22/2024  Surgeon:  Isidoro Sanchez MD  Office Location: [address]   [facility] [address]  [facility] [address]  Dept: [phone]  Dept Fax: [phone]  Referring Provider: Guerrero Vasquez MD  [address] [place] [address]   [facility] [address]  [place] [address]    Subjective   Bryson Orourke is a 79 y.o. male who presents for the following: MOHS Surgery    According to the patient, the lesion has been present for approximately 6 months at the time of diagnosis.  The lesion is not causing symptoms.  The lesion has not been treated previously.    The patient does not have a pacemaker / defibrillator.  The patient does not have a heart valve / joint replacement.    The patient is not on blood thinners.  The patient does not have a history of hepatitis B or C.  The patient does not have a history of HIV.  The patient does not have a history of immunosuppression (e.g. organ transplantation, malignancy, medications)    Review of Systems:  No other skin or systemic complaints other than what is documented elsewhere in the note.    MEDICAL HISTORY: clinically relevant history including significant past medical history, medications and allergies was reviewed and documented in Epic.    Objective   Well appearing patient in no apparent distress; mood and affect are within normal limits.  Vital signs: See record.  Noted on the Left lateral proximal dorsal hand  Is a 1.6 x 1.5 cm scar        The patient confirmed the identified site.    Discussion:  The nature of the diagnosis was explained. The lesion is a skin cancer.  It has a risk of local growth and distant spread. The condition is associated with sun exposure.  Warning signs of non-melanoma skin cancer discussed. Patient was instructed to perform monthly self skin examination.  We recommended that the patient have regular full skin exams given an increased risk of subsequent skin cancers. The patient was  instructed to use sun protective behaviors including use of broad spectrum sunscreens and sun protective clothing to reduce risk of skin cancers.      Risks, benefits, side effects of Mohs surgery were discussed with patient and the patient voiced understanding.  It was explained that even though the cure rate of Mohs is very high it is not 100%. Risks of surgery including but not limited to bleeding, infection, numbness, nerve damage, and scar were reviewed.  Discussion included wound care requirements, activity restrictions, likely scar outcome and time to heal.     After Mohs surgery, the defect may need to be repaired surgically and the scar may be longer than the original lesion.  Reconstruction options, risks, and benefits were reviewed including second intention healing, linear repair (4-1 ratio was explained), local flaps, skin grafts, cartilage grafts and interpolation flaps (the need for multiple surgeries was explained). Possible outcomes were reviewed including likely scar appearance, failure of flap survival, infection, bleeding and the need for revision surgery.     The pathology was reviewed, the photograph was reviewed, and the referring physician's note was reviewed.    Patient elected for Mohs surgery.    The patient has a squamous cell carcinoma.  The pathology was reviewed, the photograph was reviewed, and the referring physicians note was reviewed.  Multiple treatment options including mohs surgery (which has moderate risk of morbidity) were reviewed.    Medical Decision Making  Column 1- Squamous Cell Carcinoma (1 acute uncomplicated illness- Low)  Column 2- 3 tests reviewed (pathology, photograph, referring physician notes- Moderate)  Column 3- Modertate risk of morbidity from additional treatment- mohs surgry- Moderate)    Overall Moderate MDM

## 2024-05-22 NOTE — PROGRESS NOTES
Mohs Surgery Operative Note    Date of Surgery:  5/22/2024  Surgeon:  Isidoro Sanchez MD  Office Location: [address]  [facility] [address]  Dept: [phone]  Dept Fax: [phone]  Referring Provider: Guerrero Vasquez MD  [address] [place] [address] Dr Keon Tee [place], [address] [address]      Assessment/Plan   Pre-procedure:   Obtained informed consent: written from patient  The surgical site was identified and confirmed with the patient.     Intra-operative:   Audible time out called at : 1:10PM 05/22/24  by: Emi Valdivia MA   Verified patient name, birthdate, site, specimen bottle label & requisition.    The planned procedure(s) was again reviewed with the patient. The risks of bleeding, infection, nerve damage and scarring were reviewed. Written authorization was obtained. The patient identity, surgical site, and planned procedure(s) were verified. The provider acted as both surgeon and pathologist.     Squamous cell carcinoma of skin  Left lateral proximal dorsal hand    Mohs surgery    Consent obtained: written    Universal Protocol:  Procedure explained and questions answered to patient or proxy's satisfaction: Yes    Test results available and properly labeled: Yes    Pathology report reviewed: Yes    External notes reviewed: Yes    Photo or diagram used for site identification: Yes    Site/side marked: Yes    Slide independently reviewed by Mohs surgeon: Yes    Immediately prior to procedure a time out was called: Yes    Patient identity confirmed: verbally with patient  Preparation: Patient was prepped and draped in usual sterile fashion      Anticoagulation:  Is the patient taking prescription anticoagulant and/or aspirin prescribed/recommended by a physician? No    Was the anticoagulation regimen changed prior to Mohs? No      Anesthesia:  Anesthesia method: local infiltration  Local anesthetic: lidocaine 1% WITH epi    Procedure  Details:  Biopsy accession number: [id number]  Date of biopsy: 5/3/2024  Pre-Op diagnosis: squamous cell carcinoma  Surgery side: left  Surgical site (from skin exam): Left lateral proximal dorsal hand  Pre-operative length (cm): 1.6  Pre-operative width (cm): 1.5  Indications for Mohs surgery: anatomic location where tissue conservation is critical  Previously treated? No      Micrographic Surgery Details:  Post-operative length (cm): 1.6  Post-operative width (cm): 1.5  Number of Mohs stages: 1    Stage 1     Comments: The patient was brought into the operating room and placed in the procedure chair in the appropriate position.  The area positive by previous biopsy was identified and confirmed with the patient. The area of clinically obvious tumor was debulked using a curette and/or scalpel as needed. An incision was made following the Mohs approach through the skin. The specimen was taken to the lab, divided into 2 piece(s) and appropriately chromacoded and processed.           Tumor features identified on Mohs section: no tumor identified    Depth of defect: subcutaneous fat    Patient tolerance of procedure: tolerated well, no immediate complications    Reconstruction:  Was the defect reconstructed?: No    Fine/surface layer approximation (top stitches)   Hemostasis achieved with: electrodesiccation  Outcome: patient tolerated procedure well with no complications    Post-procedure details: sterile dressing applied and wound care instructions given    Dressing type: Gelfoam, Telfa pad and pressure dressing    Additional details:  Repair: After a discussion with the patient regarding the options for wound closure, a decision was made to proceed with second intention healing.  Dressing F/U: Surgifoam was placed in the wound. A pressure dressing was placed to help stabilize the wound and to minimize the risk of postoperative bleeding. Wound care was discussed, and the patient was given written post-operative wound  care instructions.       The final repair measured 1.6 x 1.5 cm          Wound care was discussed, and the patient was given written post-operative wound care instructions.      The patient will follow up with Isidoro Sanchez MD as needed for any post operative problems or concerns, and will follow up with their primary dermatologist as scheduled.

## 2024-05-24 ENCOUNTER — TELEPHONE (OUTPATIENT)
Dept: DERMATOLOGY | Facility: CLINIC | Age: 79
End: 2024-05-24

## 2024-05-24 ENCOUNTER — APPOINTMENT (OUTPATIENT)
Dept: PHYSICAL THERAPY | Facility: CLINIC | Age: 79
End: 2024-05-24
Payer: MEDICARE

## 2024-05-31 ENCOUNTER — APPOINTMENT (OUTPATIENT)
Dept: PHYSICAL THERAPY | Facility: CLINIC | Age: 79
End: 2024-05-31
Payer: MEDICARE

## 2024-06-05 ENCOUNTER — APPOINTMENT (OUTPATIENT)
Dept: PHYSICAL THERAPY | Facility: CLINIC | Age: 79
End: 2024-06-05
Payer: MEDICARE

## 2024-07-02 ENCOUNTER — LAB (OUTPATIENT)
Dept: LAB | Facility: LAB | Age: 79
End: 2024-07-02
Payer: MEDICARE

## 2024-07-02 ENCOUNTER — APPOINTMENT (OUTPATIENT)
Dept: PRIMARY CARE | Facility: CLINIC | Age: 79
End: 2024-07-02
Payer: MEDICARE

## 2024-07-02 VITALS
TEMPERATURE: 97.8 F | SYSTOLIC BLOOD PRESSURE: 130 MMHG | HEART RATE: 88 BPM | WEIGHT: 190.8 LBS | BODY MASS INDEX: 25.84 KG/M2 | HEIGHT: 72 IN | DIASTOLIC BLOOD PRESSURE: 80 MMHG

## 2024-07-02 DIAGNOSIS — H81.03 MENIERE'S DISEASE OF BOTH EARS: ICD-10-CM

## 2024-07-02 DIAGNOSIS — M79.674 PAIN OF RIGHT GREAT TOE: ICD-10-CM

## 2024-07-02 DIAGNOSIS — Z00.00 ROUTINE GENERAL MEDICAL EXAMINATION AT HEALTH CARE FACILITY: Primary | ICD-10-CM

## 2024-07-02 DIAGNOSIS — E78.5 DYSLIPIDEMIA: ICD-10-CM

## 2024-07-02 LAB — URATE SERPL-MCNC: 6.3 MG/DL (ref 4–7.5)

## 2024-07-02 PROCEDURE — 1036F TOBACCO NON-USER: CPT | Performed by: INTERNAL MEDICINE

## 2024-07-02 PROCEDURE — 84550 ASSAY OF BLOOD/URIC ACID: CPT

## 2024-07-02 PROCEDURE — 1170F FXNL STATUS ASSESSED: CPT | Performed by: INTERNAL MEDICINE

## 2024-07-02 PROCEDURE — 1159F MED LIST DOCD IN RCRD: CPT | Performed by: INTERNAL MEDICINE

## 2024-07-02 PROCEDURE — 1160F RVW MEDS BY RX/DR IN RCRD: CPT | Performed by: INTERNAL MEDICINE

## 2024-07-02 PROCEDURE — G0439 PPPS, SUBSEQ VISIT: HCPCS | Performed by: INTERNAL MEDICINE

## 2024-07-02 PROCEDURE — 1126F AMNT PAIN NOTED NONE PRSNT: CPT | Performed by: INTERNAL MEDICINE

## 2024-07-02 PROCEDURE — 36415 COLL VENOUS BLD VENIPUNCTURE: CPT

## 2024-07-02 RX ORDER — HYDROCHLOROTHIAZIDE 12.5 MG/1
12.5 TABLET ORAL DAILY
Qty: 100 TABLET | Refills: 3 | Status: SHIPPED | OUTPATIENT
Start: 2024-07-02

## 2024-07-02 RX ORDER — ATORVASTATIN CALCIUM 10 MG/1
10 TABLET, FILM COATED ORAL DAILY
Qty: 100 TABLET | Refills: 3 | Status: SHIPPED | OUTPATIENT
Start: 2024-07-02 | End: 2025-08-06

## 2024-07-02 ASSESSMENT — ACTIVITIES OF DAILY LIVING (ADL)
BATHING: INDEPENDENT
DRESSING: INDEPENDENT
DOING_HOUSEWORK: INDEPENDENT
MANAGING_FINANCES: INDEPENDENT
GROCERY_SHOPPING: INDEPENDENT
TAKING_MEDICATION: INDEPENDENT

## 2024-07-02 ASSESSMENT — ENCOUNTER SYMPTOMS
FEVER: 0
COUGH: 0
DEPRESSION: 0
CHILLS: 0
OCCASIONAL FEELINGS OF UNSTEADINESS: 0
SHORTNESS OF BREATH: 0
LOSS OF SENSATION IN FEET: 1
PALPITATIONS: 0
POLYDIPSIA: 0

## 2024-07-02 ASSESSMENT — PAIN SCALES - GENERAL: PAINLEVEL: 0-NO PAIN

## 2024-07-02 ASSESSMENT — PATIENT HEALTH QUESTIONNAIRE - PHQ9
1. LITTLE INTEREST OR PLEASURE IN DOING THINGS: NOT AT ALL
2. FEELING DOWN, DEPRESSED OR HOPELESS: NOT AT ALL
SUM OF ALL RESPONSES TO PHQ9 QUESTIONS 1 AND 2: 0
2. FEELING DOWN, DEPRESSED OR HOPELESS: NOT AT ALL
SUM OF ALL RESPONSES TO PHQ9 QUESTIONS 1 AND 2: 0
1. LITTLE INTEREST OR PLEASURE IN DOING THINGS: NOT AT ALL

## 2024-07-02 NOTE — PROGRESS NOTES
Subjective   Reason for Visit: Bryson Orourke is an 79 y.o. male here for a Medicare Wellness visit.     Past Medical, Surgical, and Family History reviewed and updated in chart.    Reviewed all medications by prescribing practitioner or clinical pharmacist (such as prescriptions, OTCs, herbal therapies and supplements) and documented in the medical record.    Patient presents today for an annual wellness exam    Medical history and surgical history updated today  Medication list reconciled and updated  Patient denies vision issues at this time  Patient denies hearing issues at this time    Follows with a dentist: Yes    Patient counseled about available preventative health vaccinations and provided with opportunity to update them with our office or through prescription to preferred pharmacy.    Reviewed and discussed preventative health screening recommendations for colon cancer    Reviewed and discussed preventative health recommendations for screening for prostate cancer: age completed        Patient Care Team:  Juan Diego Partida MD as PCP - General     Review of Systems   Constitutional:  Negative for chills and fever.   Respiratory:  Negative for cough and shortness of breath.    Cardiovascular:  Negative for chest pain and palpitations.   Endocrine: Negative for polydipsia and polyuria.       Objective   Vitals:  /80 (BP Location: Left arm, Patient Position: Sitting, BP Cuff Size: Large adult)   Pulse 88   Temp 36.6 °C (97.8 °F) (Temporal)   Ht 1.829 m (6')   Wt 86.5 kg (190 lb 12.8 oz)   BMI 25.88 kg/m²       Physical Exam  Constitutional:       Appearance: Normal appearance.   HENT:      Head: Normocephalic and atraumatic.      Right Ear: Tympanic membrane normal.      Left Ear: Tympanic membrane normal.      Nose: Nose normal.      Mouth/Throat:      Mouth: Mucous membranes are moist.   Eyes:      Extraocular Movements: Extraocular movements intact.      Conjunctiva/sclera: Conjunctivae normal.       Pupils: Pupils are equal, round, and reactive to light.   Neck:      Thyroid: No thyroid mass, thyromegaly or thyroid tenderness.      Vascular: No carotid bruit.   Cardiovascular:      Rate and Rhythm: Normal rate and regular rhythm.      Heart sounds: No murmur heard.     No friction rub. No gallop.   Pulmonary:      Effort: Pulmonary effort is normal. No respiratory distress.      Breath sounds: No wheezing, rhonchi or rales.   Abdominal:      General: Bowel sounds are normal.      Palpations: Abdomen is soft.      Tenderness: There is no abdominal tenderness. There is no guarding.      Hernia: No hernia is present.   Musculoskeletal:      Cervical back: Neck supple. No tenderness.      Right lower leg: No edema.      Left lower leg: No edema.   Lymphadenopathy:      Cervical: No cervical adenopathy.   Skin:     Coloration: Skin is not jaundiced.   Neurological:      General: No focal deficit present.      Mental Status: He is alert and oriented to person, place, and time.   Psychiatric:         Mood and Affect: Mood normal.         Assessment/Plan   Problem List Items Addressed This Visit       Dyslipidemia    Relevant Medications    atorvastatin (Lipitor) 10 mg tablet    Meniere's disease of both ears    Relevant Medications    hydroCHLOROthiazide (Microzide) 12.5 mg tablet     Other Visit Diagnoses       Routine general medical examination at health care facility    -  Primary    Pain of right great toe        Relevant Orders    Uric acid

## 2024-07-08 ENCOUNTER — DOCUMENTATION (OUTPATIENT)
Dept: PHYSICAL THERAPY | Facility: CLINIC | Age: 79
End: 2024-07-08
Payer: MEDICARE

## 2024-07-08 NOTE — PROGRESS NOTES
Therapy Communication Note    Patient Name: Bryson Orourke  MRN: [id number]  Today's Date: 7/8/2024     Discipline: Physical Therapy      Missed Time: No Show    Comment: patient was called last week to remind of appointment. Patient states he had planned to attend session.

## 2024-07-31 ENCOUNTER — OFFICE VISIT (OUTPATIENT)
Dept: PRIMARY CARE | Facility: CLINIC | Age: 79
End: 2024-07-31
Payer: MEDICARE

## 2024-07-31 VITALS — SYSTOLIC BLOOD PRESSURE: 147 MMHG | DIASTOLIC BLOOD PRESSURE: 77 MMHG | TEMPERATURE: 98.2 F | HEART RATE: 62 BPM

## 2024-07-31 DIAGNOSIS — F41.9 ANXIETY: Primary | ICD-10-CM

## 2024-07-31 PROCEDURE — 1036F TOBACCO NON-USER: CPT | Performed by: INTERNAL MEDICINE

## 2024-07-31 PROCEDURE — G2211 COMPLEX E/M VISIT ADD ON: HCPCS | Performed by: INTERNAL MEDICINE

## 2024-07-31 PROCEDURE — 99214 OFFICE O/P EST MOD 30 MIN: CPT | Performed by: INTERNAL MEDICINE

## 2024-07-31 PROCEDURE — 1159F MED LIST DOCD IN RCRD: CPT | Performed by: INTERNAL MEDICINE

## 2024-07-31 PROCEDURE — 1160F RVW MEDS BY RX/DR IN RCRD: CPT | Performed by: INTERNAL MEDICINE

## 2024-07-31 RX ORDER — ESCITALOPRAM OXALATE 5 MG/1
5 TABLET ORAL DAILY
Qty: 90 TABLET | Refills: 1 | Status: SHIPPED | OUTPATIENT
Start: 2024-07-31 | End: 2025-01-27

## 2024-07-31 ASSESSMENT — ENCOUNTER SYMPTOMS
SHORTNESS OF BREATH: 0
COUGH: 0
PALPITATIONS: 0
CHILLS: 0
FEVER: 0
POLYDIPSIA: 0

## 2024-07-31 NOTE — PROGRESS NOTES
Subjective   Patient ID: Brysno Orourke is a 79 y.o. male who presents for Anxiety.     79-year-old male with a baseline history of anxiety depression presents today for escalating symptoms.  At her previous encounter he did mention that he was noticing some slight increases in anxiety due to stressors in his home where there was a new neighbor and that was less than kind and had a noisy dog and it was creating increased levels of anxiety.  At that time he felt it was manageable and the symptoms would improve over time to the point where he would need any further interventions.  This in the last week or so he is recognizing that is not true, symptoms are escalating and worsening and anxiety is frequent if not daily.  He is very interested in pursuing more aggressive measures including medication at this time.  We discussed the benefits and risks of medication use and advised him specifically of medication that would control the symptoms of concern including high levels of anxiety stress insomnia due to anxiety occasional palpitations and was withdrawn mood.  He is significantly concerned about possible side effects and would like to pursue something of low risk for that.  He was advised and educated specifically in detail about the medication recommendations and offerings.  Advised for using Lexapro 5 mg with titration as possible in future pending patient results.  We also discussed interventions through his lifestyle and behaviors that can be beneficial for managing the symptoms.  She has retired within the last year and is finding difficulty occupying himself during the day.  Discussed taking steps towards a more purpose driven life with hobbies interests or volunteering with [employer] organizations that can give him purpose and drive as well as help him occupy his mind during the day away from invasive or intrusive thoughts.  Patient will follow-up with me in 6 weeks for further discussion.    Anxiety  Patient  reports no chest pain, palpitations or shortness of breath.            Review of Systems   Constitutional:  Negative for chills and fever.   Respiratory:  Negative for cough and shortness of breath.    Cardiovascular:  Negative for chest pain and palpitations.   Endocrine: Negative for polydipsia and polyuria.       Objective   /77 (BP Location: Left arm, Patient Position: Sitting, BP Cuff Size: Adult)   Pulse 62   Temp 36.8 °C (98.2 °F) (Temporal)     Physical Exam  Constitutional:       Appearance: Normal appearance.   HENT:      Head: Normocephalic and atraumatic.   Eyes:      Extraocular Movements: Extraocular movements intact.      Pupils: Pupils are equal, round, and reactive to light.   Neck:      Thyroid: No thyroid mass or thyromegaly.      Vascular: No carotid bruit.   Cardiovascular:      Rate and Rhythm: Normal rate and regular rhythm.   Musculoskeletal:      Cervical back: Neck supple.      Right lower leg: No edema.      Left lower leg: No edema.   Lymphadenopathy:      Cervical: No cervical adenopathy.   Neurological:      Mental Status: He is alert.         Assessment/Plan   Problem List Items Addressed This Visit             ICD-10-CM    Anxiety - Primary F41.9    Relevant Medications    escitalopram (Lexapro) 5 mg tablet

## 2024-08-13 DIAGNOSIS — F41.9 ANXIETY: Primary | ICD-10-CM

## 2024-08-13 RX ORDER — ESCITALOPRAM OXALATE 5 MG/1
10 TABLET ORAL DAILY
Qty: 90 TABLET | Refills: 1 | Status: SHIPPED | OUTPATIENT
Start: 2024-08-13 | End: 2024-11-11

## 2024-08-15 ENCOUNTER — TELEPHONE (OUTPATIENT)
Dept: PRIMARY CARE | Facility: CLINIC | Age: 79
End: 2024-08-15

## 2024-08-21 ENCOUNTER — OFFICE VISIT (OUTPATIENT)
Dept: PRIMARY CARE | Facility: CLINIC | Age: 79
End: 2024-08-21
Payer: MEDICARE

## 2024-08-21 VITALS — HEART RATE: 86 BPM | SYSTOLIC BLOOD PRESSURE: 129 MMHG | TEMPERATURE: 97.8 F | DIASTOLIC BLOOD PRESSURE: 70 MMHG

## 2024-08-21 DIAGNOSIS — F41.9 ANXIETY: Primary | ICD-10-CM

## 2024-08-21 DIAGNOSIS — T88.7XXA MEDICATION SIDE EFFECTS: ICD-10-CM

## 2024-08-21 PROCEDURE — 1159F MED LIST DOCD IN RCRD: CPT | Performed by: INTERNAL MEDICINE

## 2024-08-21 PROCEDURE — 1160F RVW MEDS BY RX/DR IN RCRD: CPT | Performed by: INTERNAL MEDICINE

## 2024-08-21 PROCEDURE — 1036F TOBACCO NON-USER: CPT | Performed by: INTERNAL MEDICINE

## 2024-08-21 PROCEDURE — 99213 OFFICE O/P EST LOW 20 MIN: CPT | Performed by: INTERNAL MEDICINE

## 2024-08-21 ASSESSMENT — ENCOUNTER SYMPTOMS
INSOMNIA: 1
POLYDIPSIA: 0
HEADACHES: 1
FATIGUE: 1
SHORTNESS OF BREATH: 0
MUSCULOSKELETAL PAIN: 1
CHILLS: 0
PALPITATIONS: 0
FEVER: 0
COUGH: 0

## 2024-08-21 NOTE — PROGRESS NOTES
Subjective   Patient ID: Bryson Orourke is a 79 y.o. male who presents for Medication Problem, Fatigue, Insomnia, Headache, and Muscle Pain.    79-year-old male presents today for follow-up on anxiety and medication use.  He has had a change of heart about his medication use at this time and worries that the medications he is currently taking are causing various side effects including dry mouth fatigue and has hesitancy in using them further.  He would like to try off of medication to see which involved side effects he is having or perceived side effects he is having stop with discontinuation of medication.  He also feels that his anxiety is best managed without the need for assistance of medication at this time.  He is finding a lot of support through his local [employer] and support [place] and things with his neighbor have improved as well so he is feeling more optimistic about controlling anxiety without medication assistance.  He would like to discontinue the medications at this time.  He is requesting specific instructions on how to do so safely.  He never increased the Lexapro to 10 mg as previously mentioned in a telephone message.  He currently remains only on the 5 mg daily    Fatigue  Associated symptoms include fatigue and headaches. Pertinent negatives include no chest pain, chills, coughing or fever.   Insomnia  Associated symptoms include fatigue and headaches. Pertinent negatives include no chest pain, chills, coughing or fever.   Headache   Associated symptoms include insomnia. Pertinent negatives include no coughing or fever.   Muscle Pain  Associated symptoms include fatigue and headaches. Pertinent negatives include no chest pain, fever or shortness of breath.        Review of Systems   Constitutional:  Positive for fatigue. Negative for chills and fever.   Respiratory:  Negative for cough and shortness of breath.    Cardiovascular:  Negative for chest pain and palpitations.   Endocrine: Negative for  polydipsia and polyuria.   Neurological:  Positive for headaches.   Psychiatric/Behavioral:  The patient has insomnia.        Objective   /70   Pulse 86   Temp 36.6 °C (97.8 °F) (Temporal)     Physical Exam  Constitutional:       Appearance: Normal appearance.   HENT:      Head: Normocephalic and atraumatic.   Eyes:      Extraocular Movements: Extraocular movements intact.      Pupils: Pupils are equal, round, and reactive to light.   Neck:      Thyroid: No thyroid mass or thyromegaly.   Cardiovascular:      Rate and Rhythm: Normal rate and regular rhythm.   Musculoskeletal:      Cervical back: Neck supple.      Right lower leg: No edema.      Left lower leg: No edema.   Neurological:      Mental Status: He is alert.         Assessment/Plan   Problem List Items Addressed This Visit             ICD-10-CM    Anxiety - Primary F41.9     Other Visit Diagnoses         Codes    Medication side effects     T88.7XXA

## 2024-08-21 NOTE — PATIENT INSTRUCTIONS
Reduce escitalopram to 5mg to every other day for 10 days. Then ok to stop use.     The atorvastatin can be stopped immediately.     The hydrochlorothiazide can be stopped 3 days from now.

## 2024-08-25 NOTE — PROGRESS NOTES
Discharge Summary     Patient:  Racheal Veras Total duration of encounter: 7 days   YOB: 1924 Admission Date:  4/8/2022   MRN:  [id number] PCP:  Damián Hopsno MD     Admission Information   Admission Diagnoses:   Closed fracture of right orbit, initial encounter (CMS/[facility]) [S02.85XA]  Fall, initial encounter [W19.XXXA]  Laceration of right eyebrow, initial encounter [S01.111A]  Closed fracture of maxillary sinus, initial encounter (CMS/[facility]) [S02.401A]  Urinary tract infection without hematuria, site unspecified [N39.0]  Closed fracture of proximal end of right humerus, unspecified fracture morphology, initial encounter [S42.201A]    Primary Discharge Diagnoses:   Closed fracture of proximal end of right humerus, unspecified fracture morphology, initial encounter  (primary encounter diagnosis)  Laceration of right eyebrow, initial encounter  Fall, initial encounter  Closed fracture of right orbit, initial encounter (CMS/[facility])  Closed fracture of maxillary sinus, initial encounter (CMS/[facility])  Urinary tract infection without hematuria, site unspecified  Chronic right shoulder pain  Rotator cuff tear arthropathy of right shoulder  Other closed nondisplaced fracture of proximal end of right humerus, initial encounter  Memory impairment  Staring episodes  Encephalopathy Secondary Discharge Diagnoses:   Patient Active Problem List   Diagnosis   • CHRON OBST ASTHMA UNSPECIFIED   • Lumbago   • Peptic ulcer, unspecified site, unspecified as acute or chronic, without mention of hemorrhage, perforation, or obstruction   • Pure hyperglyceridemia   • Pain in joint, upper arm   • Chronic pain   • Skin lesion of right leg   • Atrial fibrillation (CMS/[facility])   • Acute respiratory distress   • Hyponatremia   • Hyperglycemia   • Arthritis   • Asthma   • Atopic eczema   • Acute ischemic stroke (CMS/[facility])   • Coronary atherosclerosis of native coronary artery   • Leukemoid reaction   • Moderate dehydration   • Erythema   •  Physical Therapy  Physical Therapy Orthopedic Progress Note    Patient Name: Bryson Orourke  MRN: [id number]  Today's Date: 8/27/2024  Time Calculation  Start Time: 1045  Stop Time: 1130  Time Calculation (min): 45 min    Reason for visit: Chronic right shoulder pain, OA multiple sites   Referring MD: Juan Diego Jung  Insurance: [facility] medicare, [place], $20 co pay, no auth  DX: oa multiple sites, chronic right shd pain, neck pain   Visit #3  Frequency: 1 /week   Duration: 6-8 weeks  Certification Period Start Date: 04/24/24  Certification Period End Date: 07/23/24  Medicare Recert: 7/23/24-8/31/24      Current Problem  1. Primary osteoarthritis involving multiple joints        2. Chronic right shoulder pain        3. Neck pain               Precautions:  Precautions  STEADI Fall Risk Score (The score of 4 or more indicates an increased risk of falling): 2        Subjective   Patient reports chronic LBP R upper buttock, B neck and shoulder pain, R shoulder achiness, arthritis B big toe, which effects balance.  No falls since last visit.  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  Still doing small jobs. Likes to work in the yard with garden and lawn. Enjoys taking walks, but feet cause problems. Yoga 1-2x/wk.  Gets massage treatment every 6 wks or so.      Current Condition:  better     PAIN  0-10 (Numeric) Pain Score: 4      Self Reported Function (0-100%) = 80%      Objective     Posture: fwd head min rounded shoulders, but able to self correct     Shoulder AROM  Flex                  L=180 [] R=165  FyrlupcyfE=643  [] R=150   IR                     L= inf scap[] R=Inf scap  ER                    L=mid scap   [] R=T1       Shoulder Strength MMT  Flex                 R= 5/5  Abduction       R= 5/5  ER                    R=5/5     Cervical spine ROM  Flex=MIN  Ext=MOD  Left rotation= MIN  Right rotation=MIN  Left side bend=MIN  Right side bend=MIN     Special tests  Nehal Larios  Impingement negative  Speeds negative  Posterior apprehension negative  Anterior apprehension negative          Treatments:    Re-assess goals  Review HEP  Educate about proper posture, importance of balancing activity and exercise to help reduce risk of aggravating symptoms.     Access Code: [email or web address] shoulder neck   Access Code: [email or web address] at home foot / toe     Charges: 2 ex, 1 self care    Assessment:   Patient demonstrates improvements with ROM, strength and overall function. Patient was very non-compliant with PT and has not been seen since May. He states he is independent with his HEP, gets massages regularly and participates in yoga class.     Goals: Updated 8/27/2024  Resolved       PT Problem       PT Goal 1 (Adequate for Discharge)       Start:  04/24/24    Expected End:  05/22/24    Resolved:  08/27/24    Patient will demonstrate good understanding and compliance with HEP   ShOULDER ROM right = left - goal achieved         PT Goal 2 (Adequate for Discharge)       Start:  04/24/24    Expected End:  06/19/24    Resolved:  08/27/24    Decrease pain to  1/10 right shoulder - in progress  Neck AROM min dec - goal achieved  Right shoulder strength 4+/5 or better to allow lifting and reaching in ADL's - goal achieved  Improve scap mm strength to improve postural awareness and dec winging - goal achieved               Plan of Care: Updated 8/27/2024   Discharge PT. Encourage continuation of HEP for best results.       Dodie Ramey, PT, OCS   Pseudogout   • Pneumonia, organism unspecified(486)   • Medicare annual wellness visit, subsequent   • Chronic right shoulder pain   • Bacterial pneumonia   • Laceration of left lower leg   • COVID-19 virus detected   • COVID-19 virus detected   • Closed fracture of right proximal humerus        Hospital Course   Admission Narrative:    Racheal Veras is a 89 year old female who presented on 4/8/2022 with complaints of Fall   and subsequently admitted for:  Closed fracture of right orbit, initial encounter (CMS/[facility]) [S02.85XA]  Fall, initial encounter [W19.XXXA]  Laceration of right eyebrow, initial encounter [S01.111A]  Closed fracture of maxillary sinus, initial encounter (CMS/[facility]) [S02.401A]  Urinary tract infection without hematuria, site unspecified [N39.0]  Closed fracture of proximal end of right humerus, unspecified fracture morphology, initial encounter [S42.201A]    Please refer to admitting History of Present Illness for details.  Primary hospital course is as follows:    Fall, unclear etiology  Suspecting mechanical fall from frailty, physical deconditioning.  Right orbital, maxillary sinus fracture-traumatic from fall   Comminuted fracture of the proximal humeral shaft with posterior displacement, traumatic   CT scan showed fractures of the anterior and posterior walls of the right maxillary sinus and inferior/lateral right orbital walls.  Right shoulder x-ray with fracture the proximal humeral shaft. Orthopedics consulted, recommended to continue shoulder immobilizer.  Recommend mobilization at 4 weeks if there is evidence of fracture healing.  Follow-up with orthopedic OPC in 4 weeks.  No surgical intervention recommended.  Right ocular movements intact with no evidence of intracranial bleed.  Patient was evaluated by therapy recommended discharge to subacute rehab.  Patient was treated with p.r.n. pain medications with adequate pain control.     Right hip and pelvic pain, musculoskeletal  X-ray of the  right hip showed intact arthroplasty.  X-ray of the pelvis negative for fracture.  Continue pain medications     Hyponatremia, improved  Repeat labs show improvement, continue to encourage increased p.o. intake.     Aphasia, progressive  History of stroke  Advanced dementia  Continue aspirin, statin  Patient was evaluated by speech therapy, recommended dysphagia 2/ground/minced Diet   Neurology was also consulted due to aphasia, additional workup completed including EEG which showed evidence of encephalopathy.  No evidence focal abnormalities or seizure activity.  Medications are subsequently reviewed in further adjustments made with noted increase in alertness during hospitalization.  See AVS for discharge medications.    Recent UTI, tx w/ Bactrim   Due to recurrent UTIs, patient's family requested she be continued suppressive therapy.  Risks/benefits discuss per geriatrics with patient's family.     Paroxysmal atrial fibrillation, rate controlled   Not on anticoagulation due to risk of fall.  Continue rate control medications.     Low TSH, possible hyperthyroidism  T3 and free T4 WNL  Recommend repeat TSH/thyroid function tests within 3-4 weeks     COPD, unspecified without exacerbation   Continue home bronchodilator therapy     Leukocytosis, resolved  Likely reactive    During hospitalization POA was activated following further discussions with geriatrics.  Plan is for discharge to subacute rehab, although per documentation patient's family were open to consideration for palliative care/hospice at home following discharge patient continues to decline.       Discharged Condition: Stable  Encounter: Observation  Primary Care Physician: Damián Hopson MD    Consultants:  IP Consult Orders (From admission, onward)             Start     Ordered    04/11/22 1138  Inpatient consult to Neurology  ONE TIME        Provider:  Bibi Mcdonald MD    04/11/22 1138    04/10/22 1212  Inpatient consult to Orthopedic Surgery  ONE  TIME        Provider:  Boo Acuna III, MD    04/10/22 1211    04/08/22 1049  Inpatient consult to Geriatrics  ONE TIME        Provider:  Macho Pritchard MD    04/08/22 1048                  Discharge Physical Exam     Visit Vitals  BP (!) 156/75 (BP Location: LUE - Left upper extremity, Patient Position: Semi-Oliveros's)   Pulse (!) 105   Temp 98.2 °F (36.8 °C) (Oral)   Resp 20   Ht 5' 4\" (1.626 m)   Wt 52.3 kg (115 lb 6.4 oz)   SpO2 94%   BMI 19.81 kg/m²     General:  Alert, no acute distress  Right periorbital ecchymosis   CV: irregularly irregular  Resp: diminished bilateral bases and no accessory muscle use , no wheezing  Abd: soft, nontender, nondistended and bowel sounds  present  Ext: edema absent .  Right shoulder on sling  Neuro: Aphasia.  No significant facial droop.  Moving upper extremities equally. Diminished movement in both lower extremities likely from positioning. Able to lifting both legs against gravity.    Wt Readings from Last 3 Encounters:   04/08/22 52.3 kg (115 lb 6.4 oz)   03/25/22 53.1 kg (117 lb)   12/13/21 49.4 kg (109 lb)       Discharge Information     Diet:  Nursing To Pass Tray - Feed Patient  Nursing To Pass Tray - Constant Supervision  Dysphagia 2/ground/minced Diet  Activity:  As per surgery recommendations   Wound Care:  As per surgery recommendations Disposition:  TON[facility]      Follow-up Appointments   Damián Hopson MD  [address] [facility] [address]  [phone]    Schedule an appointment as soon as possible for a visit in 1 week      [facility] [place]  [address] [facility] [address] [phone]        Boo Acuna III, MD  [address]  [facility] [address]  [facility] [address] [phone]    Schedule an appointment as soon as possible for a visit      Future Appointments   Date Time Provider Department Center   4/19/2022 11:00 AM Pippa Sloan DPM [facility]   4/26/2022 10:30 AM Alaina Coley PA-C  [id number]   5/9/2022  1:30 PM Simon Riggs MD [facility]   5/25/2022 10:00 AM Damián Hopson MD [facility]       Lab Summary     Microbiology Results     None          Recent Labs   Lab 04/14/22 0437 04/13/22 0448 04/12/22 0443   SODIUM 134* 132* 129*   POTASSIUM 4.5 4.5 5.1   CHLORIDE 105 106 101   CO2 22 22 23   ANIONGAP 12 9* 10   BUN 13 14 12   CREATININE 0.85 1.02* 1.01*   GLUCOSE 119* 125* 109*   CALCIUM 9.0 9.0 8.9       Recent Labs   Lab 04/14/22 0437 04/13/22 0448 04/12/22  0443   WBC 7.9 10.0 12.2*   ANEUT 5.3 7.0 9.9*   RBC 3.20* 3.38* 3.41*   HGB 9.5* 10.1* 10.2*   HCT 29.0* 30.4* 30.2*    268 243       No results found    Recent Labs   Lab 04/12/22  2150   USPG 1.008   UPROT Negative   UWBC Small*   URBC Small*   UNITR Negative   UPH 6.0        No results found    No results found    No results found               ____________________________  Ade Oh MD  [facility] [place]

## 2024-08-27 ENCOUNTER — TREATMENT (OUTPATIENT)
Dept: PHYSICAL THERAPY | Facility: CLINIC | Age: 79
End: 2024-08-27
Payer: MEDICARE

## 2024-08-27 DIAGNOSIS — G89.29 CHRONIC RIGHT SHOULDER PAIN: ICD-10-CM

## 2024-08-27 DIAGNOSIS — M25.511 CHRONIC RIGHT SHOULDER PAIN: ICD-10-CM

## 2024-08-27 DIAGNOSIS — M54.2 NECK PAIN: ICD-10-CM

## 2024-08-27 DIAGNOSIS — M15.9 PRIMARY OSTEOARTHRITIS INVOLVING MULTIPLE JOINTS: Primary | ICD-10-CM

## 2024-08-27 PROCEDURE — 97110 THERAPEUTIC EXERCISES: CPT | Mod: GP

## 2024-08-27 PROCEDURE — 97535 SELF CARE MNGMENT TRAINING: CPT | Mod: GP

## 2024-08-27 ASSESSMENT — PAIN SCALES - GENERAL: PAINLEVEL_OUTOF10: 4

## 2024-09-16 ENCOUNTER — APPOINTMENT (OUTPATIENT)
Dept: DERMATOLOGY | Facility: CLINIC | Age: 79
End: 2024-09-16
Payer: MEDICARE

## 2024-09-16 DIAGNOSIS — L73.9 FOLLICULITIS: ICD-10-CM

## 2024-09-16 DIAGNOSIS — L82.0 INFLAMED SEBORRHEIC KERATOSIS: Primary | ICD-10-CM

## 2024-09-16 DIAGNOSIS — L57.8 DIFFUSE PHOTODAMAGE OF SKIN: ICD-10-CM

## 2024-09-16 DIAGNOSIS — D22.5 MELANOCYTIC NEVUS OF TRUNK: ICD-10-CM

## 2024-09-16 DIAGNOSIS — L82.1 SEBORRHEIC KERATOSIS: ICD-10-CM

## 2024-09-16 DIAGNOSIS — Z85.828 HISTORY OF NONMELANOMA SKIN CANCER: ICD-10-CM

## 2024-09-16 DIAGNOSIS — L81.4 LENTIGO: ICD-10-CM

## 2024-09-16 DIAGNOSIS — L57.0 ACTINIC KERATOSIS: ICD-10-CM

## 2024-09-16 PROCEDURE — 1159F MED LIST DOCD IN RCRD: CPT | Performed by: DERMATOLOGY

## 2024-09-16 PROCEDURE — 17004 DESTROY PREMAL LESIONS 15/>: CPT | Performed by: DERMATOLOGY

## 2024-09-16 PROCEDURE — 17110 DESTRUCTION B9 LES UP TO 14: CPT | Performed by: DERMATOLOGY

## 2024-09-16 PROCEDURE — 99214 OFFICE O/P EST MOD 30 MIN: CPT | Performed by: DERMATOLOGY

## 2024-09-16 NOTE — PROGRESS NOTES
Subjective     Bryson Orourke is a 79 y.o. male who presents for the following: Skin Check.  He notes a raised, rough bump in front of his left ear, which has been present for several months and sometimes hurts, especially when it catches on his hands.  He also note intermittent pimples on his thighs.  He denies any other new, changing, or concerning skin lesions since his last visit; no bleeding, itching, or burning lesions.      Review of Systems:  No other skin or systemic complaints other than what is documented elsewhere in the note.    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 Sanchez on 5/22/24  - BCC on right lateral distal arm diagnosed on 10/4/21 s/p ED&C on 11/11/21  - numerous AKs s/p course of topical field therapy with Efudex 5% cream on his face and scalp completed in December 2019  - Tinea pedis  - allergic contact dermatitis to poison ivy with auto-eczematization  - seborrheic dermatitis  - no h/o melanoma    Family History:    Sister - nonmelanoma skin cancer  No family history of melanoma    Social History:    The patient works as a  and cabrera and draws on Fridays    Allergies:  Acyclovir, Diphenhydramine, Escitalopram, Famciclovir, Penicillin, Penicillins, and Prednisone    Current Medications / CAM's:  No current outpatient medications on file.     Objective   Well appearing patient in no apparent distress; mood and affect are within normal limits.    A full examination was performed including scalp, face, eyes, ears, nose, lips,  neck, chest, axillae, abdomen, back, bilateral upper extremities, and bilateral lower extremities. All findings within normal limits unless otherwise noted below.    Assessment/Plan   1. Inflamed seborrheic keratosis  Head - Anterior (Face)  On the patient's left pre-auricular cheek, there is a 1 cm erythematous and light brown-colored, hyperkeratotic, stuck-on appearing papule with a surrounding rim of erythema    Inflamed Seborrheic Keratosis - left pre-auricular cheek.  The benign nature of this lesion was discussed with the patient today and reassurance provided.  Given the history the patient provides of frequent irritation and associated symptoms as well as its inflamed appearance on exam today, I offered to treat this lesion with liquid nitrogen cryotherapy.  The patient expressed understanding, is in agreement with this plan, and wishes to proceed with cryotherapy today.    Destr of lesion - Head - Anterior (Face)  Complexity: simple    Destruction method: cryotherapy    Informed consent: discussed and consent obtained    Lesion destroyed using liquid nitrogen: Yes    Cryotherapy cycles:  2  Outcome: patient tolerated procedure well with no complications    Post-procedure details: wound care instructions given      2. Actinic keratosis (19)  Head - Anterior (Face) (19)  Scattered on the patient's face, there are multiple erythematous, gritty, scaly macules     Actinic Keratoses - scattered on face.  The pre-cancerous nature of these lesions and treatment options, including liquid nitrogen cryotherapy and field therapy, such as with a course of topical therapy with Efudex 5% cream, were discussed extensively with the patient today.  At this time, I recommend treatment with liquid nitrogen cryotherapy in the office today.  In addition, following cryotherapy, I recommend topical field therapy with a course of Efudex 5% cream, which the patient was instructed to apply twice daily to affected areas of his face  for 2-3 weeks.  The risks, benefits, and side effects of this medication, including the redness and irritation expected with its use, were discussed; the patient was instructed to discontinue use of the medication earlier if necessary due to excessive irritation.  I also prescribed topical steroid therapy with Triamcinolone 0.025% cream, which the patient may apply twice daily to affected areas for up to 7-10 days as needed for inflammation following the course of Efudex, which the patient plans to complete this January 2025.  The risks, benefits, and side effects of these medications, including the redness, irritation, and discomfort associated with Efudex use as well as possible skin atrophy with overuse of topical steroids, were discussed at length.  I will have the patient return to my office in 4-6 months for follow-up.  The patient expressed understanding, is in agreement with this plan, and wishes to proceed with cryotherapy today.    Destr of lesion - Head - Anterior (Face) (19)  Complexity: simple    Destruction method: cryotherapy    Informed consent: discussed and consent obtained    Lesion destroyed using liquid nitrogen: Yes    Cryotherapy cycles:  1  Outcome: patient tolerated procedure well with no complications    Post-procedure details: wound care instructions given      3. Melanocytic nevus of trunk  Scattered on the patient's face, neck, trunk, and extremities, there are several small, round- to oval-shaped, brown-pigmented and pink-colored, symmetric, uniform-appearing macules and dome-shaped papules    Clinically benign- to slightly atypical-appearing nevi - the clinically benign- to slightly atypical-appearing nature of the patient's nevi was discussed with the patient today.  None of the patient's nevi meet threshold for biopsy today.  I emphasized the importance of performing monthly self-skin exams using the ABCDs of monitoring moles, which were reviewed with the patient today and an  informational hand-out provided.  I also emphasized the importance of sun avoidance and sun protection with daily sunscreen use.  The patient expressed understanding and is in agreement with this plan.    4. Seborrheic keratosis  Scattered on the patient's face, neck, trunk, and extremities, there are multiple tan- to light brown-colored, hyperkeratotic, stuck-on appearing papules of varying size and shape    Seborrheic Keratoses - the benign nature of these lesions was discussed with the patient today and reassurance provided.  No treatment is medically indicated for these lesions at this time.    5. Lentigo  Photodistributed  Multiple tan- to light brown-colored, round- to oval-shaped, symmetric and uniform-appearing macules and small patches consistent with lentigines scattered in sun-exposed areas.    Solar Lentigines and photodamage.  The clinically benign-appearing nature of these lesions and their relation to chronic sun exposure were discussed with the patient today and reassurance provided.  None of these lesions meet threshold for biopsy today, and thus no treatment is medically indicated for these lesions at this time.  The signs and symptoms of skin cancer were reviewed and the patient was advised to practice sun protection and sun avoidance, use daily sunscreen, and perform regular self skin exams.  The patient was instructed to monitor these lesions for any changes, such as in size, shape, or color, or associated symptoms and to call our office to schedule a return visit for re-evaluation if any such changes or symptoms are noticed in the future.  The patient expressed understanding and is in agreement with this plan.    6. Folliculitis  Left Thigh - Anterior  Scattered on the patient's bilateral thighs, there are several follicular-based erythematous, inflammatory papules and pustules    Folliculitis - flare on bilateral thighs.  The bacterial nature of this condition and treatment options were  discussed with the patient today.  At this time, I recommend topical antibiotic therapy with Clindamycin 1% lotion, which the patient was instructed to apply twice daily to the affected areas or up to 3-4 times per day as needed for active lesions.  The risks, benefits, and side effects of this medication were discussed.  The patient expressed understanding and is in agreement with this plan.    7. History of nonmelanoma skin cancer  On the patient's left lateral proximal dorsal hand, right lateral distal arm, and scattered on his face, neck, trunk, and extremities, there are well-healed scars with no evidence of recurrent growth on exam today.    History of nonmelanoma skin cancers and actinic keratoses and photodamage.  There is no evidence of recurrence at the sites of the patient's previously treated NMSCs on exam today.  The signs and symptoms of skin cancer were reviewed and the patient was advised to practice sun protection and sun avoidance, use daily sunscreen, and perform regular self skin exams.  I will have the patient return to our office in 4-6 months for routine follow-up and skin exam, and the patient was instructed to call our office should the patient notice any new, changing, symptomatic, or otherwise concerning skin lesions before then.  The patient expressed understanding and is in agreement with this plan.    8. Diffuse photodamage of skin  Photodistributed  Diffuse photodamage with actinic changes with telangiectasia and mottled pigmentation in sun-exposed areas.    Photodamage.  The signs and symptoms of skin cancer were reviewed and the patient was advised to practice sun protection and sun avoidance, use daily sunscreen, and perform regular self skin exams.  Sun protection was discussed, including avoiding the mid-day sun, wearing a sunscreen with SPF at least 50, and stressing the need for reapplication of sunscreen and applying more than they think they need.

## 2024-09-17 ENCOUNTER — APPOINTMENT (OUTPATIENT)
Dept: PRIMARY CARE | Facility: CLINIC | Age: 79
End: 2024-09-17
Payer: MEDICARE

## 2024-09-17 VITALS — DIASTOLIC BLOOD PRESSURE: 76 MMHG | SYSTOLIC BLOOD PRESSURE: 116 MMHG

## 2024-09-17 DIAGNOSIS — F41.9 ANXIETY: ICD-10-CM

## 2024-09-17 DIAGNOSIS — H81.03 MENIERE'S DISEASE OF BOTH EARS: ICD-10-CM

## 2024-09-17 DIAGNOSIS — J02.9 PHARYNGITIS, UNSPECIFIED ETIOLOGY: Primary | ICD-10-CM

## 2024-09-17 PROCEDURE — 1036F TOBACCO NON-USER: CPT | Performed by: INTERNAL MEDICINE

## 2024-09-17 PROCEDURE — 1160F RVW MEDS BY RX/DR IN RCRD: CPT | Performed by: INTERNAL MEDICINE

## 2024-09-17 PROCEDURE — 1159F MED LIST DOCD IN RCRD: CPT | Performed by: INTERNAL MEDICINE

## 2024-09-17 PROCEDURE — 99213 OFFICE O/P EST LOW 20 MIN: CPT | Performed by: INTERNAL MEDICINE

## 2024-09-17 RX ORDER — AZITHROMYCIN 250 MG/1
TABLET, FILM COATED ORAL
Qty: 6 TABLET | Refills: 0 | Status: SHIPPED | OUTPATIENT
Start: 2024-09-17 | End: 2024-09-22

## 2024-09-17 ASSESSMENT — ENCOUNTER SYMPTOMS
POLYDIPSIA: 0
PALPITATIONS: 0
COUGH: 0
SHORTNESS OF BREATH: 0
CHILLS: 0
FEVER: 0

## 2024-09-17 NOTE — PROGRESS NOTES
Subjective   Patient ID: Byrson Orourke is a 79 y.o. male who presents for No chief complaint on file..    79-year-old male presents today by telephone-based virtual assessment for the purposes of routine follow-up, he is located at his home in [place] and I am located in [place].  Patient consents to a visit completed in this manner.    At the start of our visit he discussed a 5-day history of sore throat without cough and tender glands under his jaw.  No known strep contacts symptoms of not been improving over the course of the 5 days.  He does not have COVID as he is tested within the last 24 hours on 2 occasions both negative.    Anxiety remains controlled through conservative measures in the patient's opinion.  He is working with a counselor, using a anxiety management dakota, participating in other activities that help him control and minimize the experience of anxiety such as volunteering.  At this time he believes himself to be without the need of medication which is his preferred method of treatment and we will move forward with conservative monitoring at this time.    Patient's home blood pressure is documented in the chart, no headaches or complications from medication discontinuation.    He has noticed a minor uptake and is chronic tinnitus related to Ménière's disease since discontinuing the diuretic medication, he does not believe it to be problematic enough to consider returning back onto the medication.  No vertigo symptoms.    Follow-up advised in 4 months             Review of Systems   Constitutional:  Negative for chills and fever.   HENT:  Positive for tinnitus.    Respiratory:  Negative for cough and shortness of breath.    Cardiovascular:  Negative for chest pain and palpitations.   Endocrine: Negative for polydipsia and polyuria.       Objective   /78     Physical Exam    Assessment/Plan   Assessment & Plan  Pharyngitis, unspecified etiology    Orders:    azithromycin (Zithromax) 250 mg tablet;  Take 2 tablets (500 mg) by mouth once daily for 1 day, THEN 1 tablet (250 mg) once daily for 4 days. Take 2 tabs (500 mg) by mouth today, than 1 daily for 4 days..    Meniere's disease of both ears         Anxiety

## 2024-10-03 DIAGNOSIS — M19.072 ARTHRITIS OF BOTH FEET: Primary | ICD-10-CM

## 2024-10-03 DIAGNOSIS — M19.071 ARTHRITIS OF BOTH FEET: Primary | ICD-10-CM

## 2024-10-08 ENCOUNTER — APPOINTMENT (OUTPATIENT)
Dept: PODIATRY | Facility: CLINIC | Age: 79
End: 2024-10-08
Payer: MEDICARE

## 2024-10-30 ENCOUNTER — APPOINTMENT (OUTPATIENT)
Dept: PODIATRY | Facility: CLINIC | Age: 79
End: 2024-10-30
Payer: MEDICARE

## 2024-10-30 ENCOUNTER — HOSPITAL ENCOUNTER (OUTPATIENT)
Dept: RADIOLOGY | Facility: CLINIC | Age: 79
Discharge: HOME | End: 2024-10-30
Payer: MEDICARE

## 2024-10-30 DIAGNOSIS — B35.1 ONYCHOMYCOSIS: ICD-10-CM

## 2024-10-30 DIAGNOSIS — M20.21 HALLUX RIGIDUS OF BOTH FEET: ICD-10-CM

## 2024-10-30 DIAGNOSIS — Q66.71 PES CAVUS OF BOTH FEET: ICD-10-CM

## 2024-10-30 DIAGNOSIS — R26.89 ANTALGIC GAIT: ICD-10-CM

## 2024-10-30 DIAGNOSIS — B35.3 TINEA PEDIS OF BOTH FEET: Primary | ICD-10-CM

## 2024-10-30 DIAGNOSIS — M20.22 HALLUX RIGIDUS OF BOTH FEET: ICD-10-CM

## 2024-10-30 DIAGNOSIS — Q66.72 PES CAVUS OF BOTH FEET: ICD-10-CM

## 2024-10-30 PROCEDURE — 73630 X-RAY EXAM OF FOOT: CPT | Mod: 50

## 2024-10-30 PROCEDURE — 1159F MED LIST DOCD IN RCRD: CPT | Performed by: PODIATRIST

## 2024-10-30 PROCEDURE — 1160F RVW MEDS BY RX/DR IN RCRD: CPT | Performed by: PODIATRIST

## 2024-10-30 PROCEDURE — 99204 OFFICE O/P NEW MOD 45 MIN: CPT | Performed by: PODIATRIST

## 2024-10-30 PROCEDURE — 99214 OFFICE O/P EST MOD 30 MIN: CPT | Performed by: PODIATRIST

## 2024-10-30 PROCEDURE — 1036F TOBACCO NON-USER: CPT | Performed by: PODIATRIST

## 2024-10-30 RX ORDER — KETOCONAZOLE 20 MG/G
CREAM TOPICAL
Qty: 60 G | Refills: 1 | Status: SHIPPED | OUTPATIENT
Start: 2024-10-30

## 2024-10-31 ENCOUNTER — APPOINTMENT (OUTPATIENT)
Dept: ORTHOPEDIC SURGERY | Facility: HOSPITAL | Age: 79
End: 2024-10-31
Payer: MEDICARE

## 2024-11-20 ENCOUNTER — APPOINTMENT (OUTPATIENT)
Dept: PODIATRY | Facility: CLINIC | Age: 79
End: 2024-11-20
Payer: MEDICARE

## 2024-11-25 ENCOUNTER — APPOINTMENT (OUTPATIENT)
Dept: PODIATRY | Facility: CLINIC | Age: 79
End: 2024-11-25
Payer: MEDICARE

## 2024-12-20 ENCOUNTER — OFFICE VISIT (OUTPATIENT)
Dept: URGENT CARE | Age: 79
End: 2024-12-20
Payer: MEDICARE

## 2024-12-20 ENCOUNTER — OFFICE VISIT (OUTPATIENT)
Dept: PRIMARY CARE | Facility: HOSPITAL | Age: 79
End: 2024-12-20
Payer: MEDICARE

## 2024-12-20 VITALS
DIASTOLIC BLOOD PRESSURE: 80 MMHG | HEART RATE: 66 BPM | BODY MASS INDEX: 25.73 KG/M2 | HEIGHT: 72 IN | RESPIRATION RATE: 18 BRPM | WEIGHT: 190 LBS | TEMPERATURE: 97 F | OXYGEN SATURATION: 95 % | SYSTOLIC BLOOD PRESSURE: 160 MMHG

## 2024-12-20 VITALS
DIASTOLIC BLOOD PRESSURE: 85 MMHG | HEART RATE: 69 BPM | SYSTOLIC BLOOD PRESSURE: 159 MMHG | WEIGHT: 195.9 LBS | TEMPERATURE: 98.2 F | BODY MASS INDEX: 26.57 KG/M2

## 2024-12-20 DIAGNOSIS — D49.2 ABNORMAL SKIN GROWTH: Primary | ICD-10-CM

## 2024-12-20 DIAGNOSIS — L98.9 SKIN LESION OF FACE: ICD-10-CM

## 2024-12-20 DIAGNOSIS — I10 BENIGN HYPERTENSION: ICD-10-CM

## 2024-12-20 DIAGNOSIS — R42 DIZZINESS: Primary | ICD-10-CM

## 2024-12-20 PROCEDURE — 99213 OFFICE O/P EST LOW 20 MIN: CPT | Performed by: INTERNAL MEDICINE

## 2024-12-20 PROCEDURE — 3077F SYST BP >= 140 MM HG: CPT | Performed by: INTERNAL MEDICINE

## 2024-12-20 PROCEDURE — 3079F DIAST BP 80-89 MM HG: CPT | Performed by: INTERNAL MEDICINE

## 2024-12-20 PROCEDURE — 1036F TOBACCO NON-USER: CPT | Performed by: INTERNAL MEDICINE

## 2024-12-20 PROCEDURE — 1126F AMNT PAIN NOTED NONE PRSNT: CPT | Performed by: INTERNAL MEDICINE

## 2024-12-20 RX ORDER — LOSARTAN POTASSIUM 25 MG/1
25 TABLET ORAL DAILY
Qty: 90 TABLET | Refills: 3 | Status: SHIPPED | OUTPATIENT
Start: 2024-12-20 | End: 2025-12-20

## 2024-12-20 ASSESSMENT — PAIN SCALES - GENERAL: PAINLEVEL_OUTOF10: 0-NO PAIN

## 2024-12-20 ASSESSMENT — ENCOUNTER SYMPTOMS
COUGH: 0
SHORTNESS OF BREATH: 0
FEVER: 0
POLYDIPSIA: 0
CHILLS: 0
PALPITATIONS: 0

## 2024-12-20 NOTE — PROGRESS NOTES
Subjective   Patient ID: Bryson Orourke is a 79 y.o. male who presents for Follow-up.    79-year-old male presents today for an acute appointment due to lesion of the skin on the left temporal region it is mildly tender to palpation he noticed it for the first time 2 days ago he routinely follows with dermatology has had multiple skin excisions but no appointment in the recent months.  He is does not believe that this has been previously identified.  No bleeding no ulceration no blisters other than the tenderness no other local changes.    History of benign hypertension previously trialed on hydrochlorothiazide for treatment with his history of Ménière's did not tolerate it well want to come off all medications.  He is not second-guessing that idea and is open minded to the idea of starting blood pressure medications.  Given his previous experience we will start conservatively with low-dose medication titrate according to patient tolerance and effect with close follow-up in 4 weeks.  Low-dose loss of losartan advised and sent to local pharmacy will reevaluate as directed above.         Review of Systems   Constitutional:  Negative for chills and fever.   Respiratory:  Negative for cough and shortness of breath.    Cardiovascular:  Negative for chest pain and palpitations.   Endocrine: Negative for polydipsia and polyuria.       Objective   /85 (BP Location: Left arm, Patient Position: Sitting, BP Cuff Size: Adult)   Pulse 69   Temp 36.8 °C (98.2 °F) (Temporal)   Wt 88.9 kg (195 lb 14.4 oz)   BMI 26.57 kg/m²     Physical Exam  HENT:      Head: Normocephalic and atraumatic.      Right Ear: Tympanic membrane and ear canal normal. There is no impacted cerumen.      Left Ear: Tympanic membrane and ear canal normal. There is no impacted cerumen.      Nose: Nose normal.      Mouth/Throat:      Mouth: Mucous membranes are moist.      Pharynx: Oropharynx is clear.   Eyes:      Extraocular Movements: Extraocular  movements intact.      Pupils: Pupils are equal, round, and reactive to light.   Cardiovascular:      Rate and Rhythm: Normal rate.   Musculoskeletal:      Cervical back: Neck supple.   Skin:     Comments: Flesh colored pearly lesion of left temporal scalp hidden in hairline. No ulceration         Assessment/Plan   Assessment & Plan  Abnormal skin growth  Left temporal region in the hairline. Suspected basal cell cancer. Referred to dermatology.   Orders:    Follow Up In Dermatology; Future    Benign hypertension    Orders:    losartan (Cozaar) 25 mg tablet; Take 1 tablet (25 mg) by mouth once daily.

## 2024-12-20 NOTE — PATIENT INSTRUCTIONS
Please follow up with your primary care physician today at 3pm  Please follow up with your dermatologist  Please present to the ER if symptoms if your symptoms worsen or fail to improve

## 2024-12-20 NOTE — PROGRESS NOTES
Subjective   Patient ID: Bryson Orourke is a 79 y.o. male. They present today with a chief complaint of bump on side of head (X 2 days. No pain, no injury).    History of Present Illness  Patient reports ~2 days of intermittent dizziness  Dizziness is overall improving  Notes that he had a similar episode ~1 week ago, seen at an [facility]  Notes a friend had a aneurysm rupture a year ago and wonders if he could have it  Reports hx of Meniere's disease  Reports hx of shingles   Denies palpitations, chest pain  Denies new numbness or tingling  Tried to increase his water intake this morning  Briefly smoked in college  Reports hx of HTN, elevated LDL  No reported hx of stroke  Denies current difficulty reading/vision loss  Denies jaw claudication  No reported hx of PMR  No longer on psych meds      Past Medical History  Allergies as of 12/20/2024 - Reviewed 12/20/2024   Allergen Reaction Noted    Acyclovir Other 02/01/2011    Diphenhydramine Hallucinations and Unknown 07/25/2005    Escitalopram Diarrhea and Other 08/21/2024    Famciclovir Nausea/vomiting 07/11/2020    Penicillin Hives 12/29/2023    Penicillins Unknown 05/30/2019    Prednisone Unknown 02/01/2011       (Not in a hospital admission)       Past Medical History:   Diagnosis Date    Abdominal distension (gaseous) 10/26/2016    Bloating    Actinic keratosis 05/25/2023    Allergic 1960    Arthritis 1970    Change in bowel habit 06/02/2020    Change in bowel habits    Cholesterolosis of gallbladder 10/26/2016    Gallbladder polyp    Enterocolitis due to Clostridium difficile, not specified as recurrent     C. difficile colitis    HL (hearing loss) 2006    Hypoxemia 02/26/2019    Hypoxia    Other specified postprocedural states 05/07/2015    History of colonoscopy    Other symptoms and signs involving emotional state 06/02/2020    Depressed mood    Paget's disease of bone 09/14/2017    Pain in unspecified knee 03/18/2014    Acute knee pain    Personal history of  other drug therapy 06/02/2020    History of influenza vaccination    Personal history of other specified conditions 06/02/2020    History of itching    Shingles 12/29/2023    Squamous cell carcinoma of skin of nose 05/25/2023    Trigeminal neuralgia 07/30/2020    Trigeminal neuralgia of left side of face    Xerosis cutis 05/25/2023       Past Surgical History:   Procedure Laterality Date    OTHER SURGICAL HISTORY  03/23/2015    Chemosurgery (Mohs Micrographic Technique)    SKIN CANCER EXCISION          reports that he has never smoked. He has never used smokeless tobacco. He reports that he does not drink alcohol and does not use drugs.                               Objective    Vitals:    12/20/24 1125   BP: 160/80   BP Location: Left arm   Patient Position: Sitting   BP Cuff Size: Large adult   Pulse: 66   Resp: 18   Temp: 36.1 °C (97 °F)   TempSrc: Oral   SpO2: 95%   Weight: 86.2 kg (190 lb)   Height: 1.829 m (6')     No LMP for male patient.    Physical Exam  Constitutional:       General: He is not in acute distress.     Appearance: Normal appearance. He is not toxic-appearing or diaphoretic.   HENT:      Right Ear: Tympanic membrane, ear canal and external ear normal. There is no impacted cerumen.      Left Ear: Tympanic membrane, ear canal and external ear normal. There is no impacted cerumen.      Nose: No rhinorrhea.   Eyes:      General: No visual field deficit or scleral icterus.        Right eye: No discharge.         Left eye: No discharge.      Extraocular Movements: Extraocular movements intact.   Neck:      Vascular: No carotid bruit (no apparent bruit on the left side).   Cardiovascular:      Rate and Rhythm: Normal rate and regular rhythm.   Pulmonary:      Effort: Pulmonary effort is normal.   Musculoskeletal:      Cervical back: Normal range of motion.   Skin:     Findings: Lesion present.      Comments: Male pattern baldness       LEFT FACE (anterior/superior to the ear)  Multiple rough to touch  lesions with ~scale  ~1cm diameter plaque like lesion  No bleeding  No evidence of streaking erythema  No evidence of purulence  No evidence of fluctuance     Neurological:      General: No focal deficit present.      Mental Status: He is alert and oriented to person, place, and time. Mental status is at baseline.      Cranial Nerves: Cranial nerves 2-12 are intact. No cranial nerve deficit, dysarthria or facial asymmetry.      Sensory: No sensory deficit.      Motor: No weakness or seizure activity.      Coordination: Heel to Shin Test normal.      Gait: Gait normal.      Deep Tendon Reflexes: Reflexes normal.      Comments: Elgin Hallpike equivocal     No ttp along the left temple    Psychiatric:         Mood and Affect: Mood normal.         Behavior: Behavior normal.         Thought Content: Thought content normal.      Comments: Pleasant         Procedures    Point of Care Test & Imaging Results from this visit:      Diagnostic study results (if any) were reviewed by Sandeep Chavira MD.    Assessment/Plan   Allergies, medications, history, and pertinent labs/EKGs/Imaging reviewed by Sandeep Chavira MD.     Medical Decision Making:    Patient's intermittent dizziness is potentially 2/2 vertigo from his Meniere's disease vs vasovagal presyncope vs psychogenic vs cerebrovascular vs other etiology. AFVSS satting 95% on RA. Neuro exam reassuring. Discussed the limitations of the urgent care setting for working up dizziness and that if symptoms worsen or fail to improve for him to proceed to the ER. Lesion(s) along the left side of his face is potentially an AK vs developing skin cancer vs other etiology. Follow up with his dermatologist.     Orders and Diagnoses  Diagnoses and all orders for this visit:  Dizziness  Skin lesion of face      Patient disposition: Home      Medical Admin Record      Follow Up Instructions  No follow-ups on file.    Electronically signed by Sandeep Chavira MD  1:08 PM

## 2024-12-23 ENCOUNTER — TELEPHONE (OUTPATIENT)
Dept: DERMATOLOGY | Facility: CLINIC | Age: 79
End: 2024-12-23
Payer: MEDICARE

## 2024-12-31 ENCOUNTER — OFFICE VISIT (OUTPATIENT)
Dept: URGENT CARE | Age: 79
End: 2024-12-31
Payer: MEDICARE

## 2024-12-31 VITALS
DIASTOLIC BLOOD PRESSURE: 65 MMHG | WEIGHT: 195 LBS | TEMPERATURE: 98.5 F | OXYGEN SATURATION: 96 % | HEART RATE: 93 BPM | RESPIRATION RATE: 20 BRPM | BODY MASS INDEX: 26.41 KG/M2 | HEIGHT: 72 IN | SYSTOLIC BLOOD PRESSURE: 150 MMHG

## 2024-12-31 DIAGNOSIS — R09.81 NASAL CONGESTION: ICD-10-CM

## 2024-12-31 DIAGNOSIS — J01.11 ACUTE RECURRENT FRONTAL SINUSITIS: Primary | ICD-10-CM

## 2024-12-31 DIAGNOSIS — J02.9 SORE THROAT: ICD-10-CM

## 2024-12-31 LAB
POC RAPID INFLUENZA A: NEGATIVE
POC RAPID INFLUENZA B: NEGATIVE
POC RAPID STREP: NEGATIVE
POC SARS-COV-2 AG BINAX: NORMAL

## 2024-12-31 PROCEDURE — 87880 STREP A ASSAY W/OPTIC: CPT | Performed by: NURSE PRACTITIONER

## 2024-12-31 PROCEDURE — 1159F MED LIST DOCD IN RCRD: CPT | Performed by: NURSE PRACTITIONER

## 2024-12-31 PROCEDURE — 1160F RVW MEDS BY RX/DR IN RCRD: CPT | Performed by: NURSE PRACTITIONER

## 2024-12-31 PROCEDURE — 1036F TOBACCO NON-USER: CPT | Performed by: NURSE PRACTITIONER

## 2024-12-31 PROCEDURE — 99213 OFFICE O/P EST LOW 20 MIN: CPT | Performed by: NURSE PRACTITIONER

## 2024-12-31 PROCEDURE — 87804 INFLUENZA ASSAY W/OPTIC: CPT | Performed by: NURSE PRACTITIONER

## 2024-12-31 PROCEDURE — 87811 SARS-COV-2 COVID19 W/OPTIC: CPT | Performed by: NURSE PRACTITIONER

## 2024-12-31 NOTE — PROGRESS NOTES
Subjective   Patient ID: Bryson Orourke is a 79 y.o. male. They present today with a chief complaint of No chief complaint on file..    History of Present Illness  HPI    Past Medical History  Allergies as of 12/31/2024 - Reviewed 12/31/2024   Allergen Reaction Noted    Acyclovir Other 02/01/2011    Diphenhydramine Hallucinations and Unknown 07/25/2005    Escitalopram Diarrhea and Other 08/21/2024    Famciclovir Nausea/vomiting 07/11/2020    Penicillin Hives 12/29/2023    Penicillins Unknown 05/30/2019    Prednisone Unknown 02/01/2011       (Not in a hospital admission)       Past Medical History:   Diagnosis Date    Abdominal distension (gaseous) 10/26/2016    Bloating    Actinic keratosis 05/25/2023    Allergic 1960    Arthritis 1970    Change in bowel habit 06/02/2020    Change in bowel habits    Cholesterolosis of gallbladder 10/26/2016    Gallbladder polyp    Enterocolitis due to Clostridium difficile, not specified as recurrent     C. 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 He has never used smokeless tobacco. He reports that he does not drink alcohol and does not  use drugs.    Review of Systems  Review of Systems                               Objective    There were no vitals filed for this visit.  No LMP for male patient.    Physical Exam  Vitals and nursing note reviewed.   Constitutional:       Appearance: Normal appearance.   HENT:      Head: Normocephalic.      Right Ear: Tympanic membrane and external ear normal.      Left Ear: Tympanic membrane and external ear normal.      Nose: Congestion and rhinorrhea present. Rhinorrhea is clear.      Right Turbinates: Enlarged.      Left Turbinates: Enlarged.      Mouth/Throat:      Mouth: Mucous membranes are moist.      Pharynx: Oropharynx is clear. Postnasal drip present.   Eyes:      Extraocular Movements: Extraocular movements intact.      Pupils: Pupils are equal, round, and reactive to light.   Cardiovascular:      Rate and Rhythm: Normal rate and regular rhythm.      Pulses: Normal pulses.      Heart sounds: Normal heart sounds.   Pulmonary:      Effort: Pulmonary effort is normal.      Breath sounds: Normal breath sounds.   Musculoskeletal:         General: Normal range of motion.      Cervical back: Normal range of motion and neck supple.   Skin:     General: Skin is warm and dry.   Neurological:      General: No focal deficit present.      Mental Status: He is alert and oriented to person, place, and time.   Psychiatric:         Mood and Affect: Mood normal.         Behavior: Behavior normal.         Procedures    Point of Care Test & Imaging Results from this visit  No results found for this visit on 12/31/24.   No results found.    Diagnostic study results (if any) were reviewed by [facility].    Assessment/Plan   Allergies, medications, history, and pertinent labs/EKGs/Imaging reviewed by RADHA Feng-CNP.     Medical Decision Making  78 y/o M with c/o sore throat, congestion, and clear nasal d/c without a fever, SOB, Cp, dizziness x 5 days.  Rapid covid/strep/flu- NEG    suspect viral  sinusitis d/t clinical feature s/s  including benign PE so advised to implement normal saline mist spray, flonase daily, and if s/s persist after 24-48 hours to take a covid test and f/u with PCP  Pt requests atx just in case progresses, zpak written d/t EMR down and internet not working. Educated pt on conservative management initially and hold off on atx    If ear involvement, refrain from swimming and flying until f/u with PCP  Normal saline mist spray 3 times a day to clear out sinuses and reduce PND  Advised to take daily anti-histamines   alternate Tylenol and Motrin PRN for discomfort  gargle with warm water and salt  f/u PCP 2-3 days    Most of the time, sinusitis does NOT need to be treated with antibiotics. This is because most sinusitis is caused by viruses - not bacteria - and antibiotics do not kill viruses. Many people get over sinus infections without antibiotics.  Some people with sinusitis do need treatment with antibiotics. If your symptoms have not improved after 10 - 14 days, please see your Primary Care Physician. Your doctor might recommend that you wait 1 more week to see if your symptoms improve. But if you have symptoms such as a fever, he or she might prescribe antibiotics. It is important to follow your doctor's instructions about taking your antibiotics.    Sinusitis is a condition that can cause a stuffy nose, pain in the face, and discharge (mucus) from the nose. The sinuses are hollow areas in the bones of the face. They have a thin lining that normally makes a small amount of mucus. When this lining gets irritated or infected, it swells and makes extra mucus. This causes symptoms.  Sinusitis can occur when a person gets sick with a cold. The germs causing the cold can also infect the sinuses. Many times, a person feels like his or her cold is getting better. But then he or she gets sinusitis and begins to feel sick again.    Common symptoms of sinusitis include:  -Stuffy or blocked  nose  -Thick white, yellow, or green discharge from the no  -Pain in the teeth  -Pain or pressure in the face - This often feels worse when a person bends forward.    People with sinusitis can also have other symptoms that include:  -Fever  -Cough  -Trouble smelling  -Ear pressure or fullness  -Headache  -Bad breath  -Feeling tired    Most of the time, symptoms start to improve in 7 to 10 days.    To reduce your symptoms    Discharge Home Care:    Take medication as prescribed and incorporate probiotics for gut health  normal saline mist spray three times and day and flonase daily  Increase fluids, to help thin congestion. You might use a cool mist humidifier/vaporizer in your room if the air is dry. This will help thin congestion and keep your sinus moist.  Sleeping in a more upright position can be helpful.  Using saline nose drops or mists can also help thick sinus congestion drain. Apply the mist or drops, then tip your head back and rotate from side to side to help    You can use acetaminophen (paracetamol, Tylenol) or ibuprofen (Motrin) for fever or headache. Drink warm teas with honey.  If you do not improve or you begin to worsen please follow up with your primary care physician.        PATIENT INSTRUCTIONS:    HOME CARE INSTRUCTIONS:  --- Drink plenty of water. Water helps thin the mucus so your sinuses can drain more easily.  --- Use a humidifier.  --- Inhale steam 3 to 4 times a day (for example, sit in the bathroom with the shower running).  --- Apply a warm, moist washcloth to your face 3 to 4 times a day, or as directed by your caregiver.  --- Take over-the-counter or prescription medicines for pain, discomfort, or fever only as directed by your caregiver.    SEEK FURTHER TREATMENT IF YOU:  --- You have increasing pain or severe headaches.  --- You have nausea, vomiting, or drowsiness.  --- You have swelling around your face.  --- You have vision problems.  --- You have a stiff neck.  --- You have  difficulty breathing.      Orders and Diagnoses  Diagnoses and all orders for this visit:  Acute recurrent frontal sinusitis  Nasal congestion  Sore throat      Medical Admin Record      Patient disposition: Home    Electronically signed by [facility]  3:16 PM

## 2025-01-09 ENCOUNTER — APPOINTMENT (OUTPATIENT)
Dept: PRIMARY CARE | Facility: CLINIC | Age: 80
End: 2025-01-09
Payer: MEDICARE

## 2025-01-09 ENCOUNTER — APPOINTMENT (OUTPATIENT)
Dept: DERMATOLOGY | Facility: CLINIC | Age: 80
End: 2025-01-09
Payer: MEDICARE

## 2025-01-09 VITALS
BODY MASS INDEX: 26.31 KG/M2 | HEART RATE: 71 BPM | TEMPERATURE: 97 F | DIASTOLIC BLOOD PRESSURE: 67 MMHG | SYSTOLIC BLOOD PRESSURE: 135 MMHG | WEIGHT: 194 LBS

## 2025-01-09 DIAGNOSIS — R04.0 BLEEDING NOSE: ICD-10-CM

## 2025-01-09 DIAGNOSIS — J01.00 ACUTE NON-RECURRENT MAXILLARY SINUSITIS: Primary | ICD-10-CM

## 2025-01-09 PROCEDURE — 1036F TOBACCO NON-USER: CPT | Performed by: INTERNAL MEDICINE

## 2025-01-09 PROCEDURE — 99213 OFFICE O/P EST LOW 20 MIN: CPT | Performed by: INTERNAL MEDICINE

## 2025-01-09 PROCEDURE — 1159F MED LIST DOCD IN RCRD: CPT | Performed by: INTERNAL MEDICINE

## 2025-01-09 PROCEDURE — 1160F RVW MEDS BY RX/DR IN RCRD: CPT | Performed by: INTERNAL MEDICINE

## 2025-01-09 PROCEDURE — G2211 COMPLEX E/M VISIT ADD ON: HCPCS | Performed by: INTERNAL MEDICINE

## 2025-01-09 ASSESSMENT — ENCOUNTER SYMPTOMS
POLYDIPSIA: 0
SHORTNESS OF BREATH: 0
COUGH: 0
CHILLS: 0
FEVER: 0
PALPITATIONS: 0

## 2025-01-09 NOTE — PROGRESS NOTES
Subjective   Patient ID: Bryson Orourke is a 79 y.o. male who presents for Follow-up.    79-year-old male presents today in follow-up after recent diagnosis of sinus infection, treated by me about 7 days ago through a patient message.  Since using the antibiotic originally prescribed his sore throat sinus congestion drainage and symptoms have all improved.  It took about 3 to 4 days but now he is turned the corner and feels significantly better.  His only lingering symptoms is some sinus dryness and in the recent past as he blew his nose there was some scant blood in the mucus.  No denise nosebleeds.  No uncontrollable bleeding.  Patient advised about sinus mucosal care advised on over-the-counter products that can help with hydrating the sinus and allow the mucosa to heal properly.  Advise no further antibiotics are recommended at this time monitoring and expectation for recovery at this stage.  Symptomatic care at this stage.  Patient will contact me if any symptoms to resume develop worsen or if there is significant nosebleeding moving forward.         Review of Systems   Constitutional:  Negative for chills and fever.   Respiratory:  Negative for cough and shortness of breath.    Cardiovascular:  Negative for chest pain and palpitations.   Endocrine: Negative for polydipsia and polyuria.       Objective   /67   Pulse 71   Temp 36.1 °C (97 °F) (Temporal)   Wt 88 kg (194 lb)   BMI 26.31 kg/m²     Physical Exam  HENT:      Head: Normocephalic and atraumatic.      Right Ear: Tympanic membrane and ear canal normal. There is no impacted cerumen.      Left Ear: Tympanic membrane and ear canal normal. There is no impacted cerumen.      Nose: Nose normal.      Mouth/Throat:      Mouth: Mucous membranes are moist.      Pharynx: Oropharynx is clear.   Eyes:      Extraocular Movements: Extraocular movements intact.      Pupils: Pupils are equal, round, and reactive to light.   Cardiovascular:      Rate and Rhythm:  Normal rate.   Pulmonary:      Effort: No respiratory distress.      Breath sounds: No wheezing, rhonchi or rales.   Musculoskeletal:      Cervical back: Neck supple. No tenderness.   Lymphadenopathy:      Cervical: No cervical adenopathy.         Assessment/Plan   Assessment & Plan  Acute non-recurrent maxillary sinusitis         Bleeding nose

## 2025-01-09 NOTE — PATIENT INSTRUCTIONS
Recommend isotonic nasal saline or similar non-medicated nasal rinse available over the counter.     If you use a Divina Pot, only use Distilled Water.

## 2025-01-24 ENCOUNTER — OFFICE VISIT (OUTPATIENT)
Dept: PRIMARY CARE | Facility: HOSPITAL | Age: 80
End: 2025-01-24
Payer: MEDICARE

## 2025-01-24 VITALS
TEMPERATURE: 98.1 F | SYSTOLIC BLOOD PRESSURE: 126 MMHG | DIASTOLIC BLOOD PRESSURE: 67 MMHG | WEIGHT: 190 LBS | HEART RATE: 68 BPM | BODY MASS INDEX: 25.77 KG/M2

## 2025-01-24 DIAGNOSIS — I10 BENIGN HYPERTENSION: Primary | ICD-10-CM

## 2025-01-24 PROBLEM — G47.33 OSA (OBSTRUCTIVE SLEEP APNEA): Status: RESOLVED | Noted: 2023-12-29 | Resolved: 2025-01-24

## 2025-01-24 PROCEDURE — 3078F DIAST BP <80 MM HG: CPT | Performed by: INTERNAL MEDICINE

## 2025-01-24 PROCEDURE — 1126F AMNT PAIN NOTED NONE PRSNT: CPT | Performed by: INTERNAL MEDICINE

## 2025-01-24 PROCEDURE — 1159F MED LIST DOCD IN RCRD: CPT | Performed by: INTERNAL MEDICINE

## 2025-01-24 PROCEDURE — 99213 OFFICE O/P EST LOW 20 MIN: CPT | Performed by: INTERNAL MEDICINE

## 2025-01-24 PROCEDURE — 1160F RVW MEDS BY RX/DR IN RCRD: CPT | Performed by: INTERNAL MEDICINE

## 2025-01-24 PROCEDURE — 3074F SYST BP LT 130 MM HG: CPT | Performed by: INTERNAL MEDICINE

## 2025-01-24 PROCEDURE — 1036F TOBACCO NON-USER: CPT | Performed by: INTERNAL MEDICINE

## 2025-01-24 PROCEDURE — G2211 COMPLEX E/M VISIT ADD ON: HCPCS | Performed by: INTERNAL MEDICINE

## 2025-01-24 ASSESSMENT — ENCOUNTER SYMPTOMS
POLYDIPSIA: 0
SHORTNESS OF BREATH: 0
COUGH: 0
FEVER: 0
PALPITATIONS: 0
CHILLS: 0

## 2025-01-24 ASSESSMENT — PAIN SCALES - GENERAL: PAINLEVEL_OUTOF10: 0-NO PAIN

## 2025-01-24 NOTE — PROGRESS NOTES
Subjective   Patient ID: Bryson Orourke is a 79 y.o. male who presents for Follow-up.    HTN very responsive to the Losartan. Tolerating without side effects. BP well controlled, confirmed through multiple checks today. Home BP in same range.          Review of Systems   Constitutional:  Negative for chills and fever.   Respiratory:  Negative for cough and shortness of breath.    Cardiovascular:  Negative for chest pain and palpitations.   Endocrine: Negative for polydipsia and polyuria.       Objective   /67 (BP Location: Left arm, Patient Position: Sitting, BP Cuff Size: Adult)   Pulse 68   Temp 36.7 °C (98.1 °F) (Temporal)   Wt 86.2 kg (190 lb)   BMI 25.77 kg/m²     Physical Exam  Constitutional:       Appearance: Normal appearance.   HENT:      Head: Normocephalic and atraumatic.   Eyes:      Extraocular Movements: Extraocular movements intact.      Pupils: Pupils are equal, round, and reactive to light.   Neck:      Thyroid: No thyroid mass or thyromegaly.      Vascular: No carotid bruit.   Cardiovascular:      Rate and Rhythm: Normal rate and regular rhythm.      Heart sounds: No murmur heard.     No friction rub. No gallop.   Pulmonary:      Effort: No respiratory distress.      Breath sounds: No wheezing, rhonchi or rales.   Musculoskeletal:      Cervical back: Neck supple.      Right lower leg: No edema.      Left lower leg: No edema.   Lymphadenopathy:      Cervical: No cervical adenopathy.   Neurological:      Mental Status: He is alert.         Assessment/Plan   Assessment & Plan  Benign hypertension

## 2025-01-28 ENCOUNTER — TELEPHONE (OUTPATIENT)
Dept: PRIMARY CARE | Facility: CLINIC | Age: 80
End: 2025-01-28
Payer: MEDICARE

## 2025-01-28 NOTE — TELEPHONE ENCOUNTER
[facility] sent refill request for Escitalopram 5 mg tablets with instructions to take 1 tab (5mg) po daily with a 90 day refill. Last refill was on 10/27/24.

## 2025-02-19 ENCOUNTER — APPOINTMENT (OUTPATIENT)
Dept: DERMATOLOGY | Facility: CLINIC | Age: 80
End: 2025-02-19
Payer: MEDICARE

## 2025-03-06 ENCOUNTER — APPOINTMENT (OUTPATIENT)
Dept: DERMATOLOGY | Facility: CLINIC | Age: 80
End: 2025-03-06
Payer: MEDICARE

## 2025-03-24 ENCOUNTER — APPOINTMENT (OUTPATIENT)
Dept: DERMATOLOGY | Facility: CLINIC | Age: 80
End: 2025-03-24
Payer: MEDICARE

## 2025-04-10 ASSESSMENT — DERMATOLOGY QUALITY OF LIFE (QOL) ASSESSMENT
RATE HOW BOTHERED YOU ARE BY EFFECTS OF YOUR SKIN PROBLEMS ON YOUR ACTIVITIES (EG, GOING OUT, ACCOMPLISHING WHAT YOU WANT, WORK ACTIVITIES OR YOUR RELATIONSHIPS WITH OTHERS): 0 - NEVER BOTHERED
RATE HOW BOTHERED YOU ARE BY EFFECTS OF YOUR SKIN PROBLEMS ON YOUR ACTIVITIES (EG, GOING OUT, ACCOMPLISHING WHAT YOU WANT, WORK ACTIVITIES OR YOUR RELATIONSHIPS WITH OTHERS): 0 - NEVER BOTHERED
WHAT SINGLE SKIN CONDITION LISTED BELOW IS THE PATIENT ANSWERING THE QUALITY-OF-LIFE ASSESSMENT QUESTIONS ABOUT: DERMATITIS
ARE THERE EXCLUSIONS OR EXCEPTIONS FOR THE QUALITY OF LIFE ASSESSMENT: NO
RATE HOW BOTHERED YOU ARE BY SYMPTOMS OF YOUR SKIN PROBLEM (EG, ITCHING, STINGING BURNING, HURTING OR SKIN IRRITATION): 1
RATE HOW BOTHERED YOU ARE BY SYMPTOMS OF YOUR SKIN PROBLEM (EG, ITCHING, STINGING BURNING, HURTING OR SKIN IRRITATION): 1
RATE HOW EMOTIONALLY BOTHERED YOU ARE BY YOUR SKIN PROBLEM (FOR EXAMPLE, WORRY, EMBARRASSMENT, FRUSTRATION): 1
DATE THE QUALITY-OF-LIFE ASSESSMENT WAS COMPLETED: 67312
WHAT SINGLE SKIN CONDITION LISTED BELOW IS THE PATIENT ANSWERING THE QUALITY-OF-LIFE ASSESSMENT QUESTIONS ABOUT: DERMATITIS
RATE HOW EMOTIONALLY BOTHERED YOU ARE BY YOUR SKIN PROBLEM (FOR EXAMPLE, WORRY, EMBARRASSMENT, FRUSTRATION): 1

## 2025-04-10 ASSESSMENT — PATIENT GLOBAL ASSESSMENT (PGA): WHAT IS THE PGA: PATIENT GLOBAL ASSESSMENT:  2 - MILD

## 2025-04-17 ENCOUNTER — APPOINTMENT (OUTPATIENT)
Dept: DERMATOLOGY | Facility: CLINIC | Age: 80
End: 2025-04-17
Payer: MEDICARE

## 2025-04-17 DIAGNOSIS — D48.5 NEOPLASM OF UNCERTAIN BEHAVIOR OF SKIN: Primary | ICD-10-CM

## 2025-04-17 DIAGNOSIS — L82.1 SEBORRHEIC KERATOSIS: ICD-10-CM

## 2025-04-17 DIAGNOSIS — L57.0 ACTINIC KERATOSIS: ICD-10-CM

## 2025-04-17 DIAGNOSIS — Z85.828 HISTORY OF NONMELANOMA SKIN CANCER: ICD-10-CM

## 2025-04-17 DIAGNOSIS — L57.8 DIFFUSE PHOTODAMAGE OF SKIN: ICD-10-CM

## 2025-04-17 DIAGNOSIS — D22.5 MELANOCYTIC NEVUS OF TRUNK: ICD-10-CM

## 2025-04-17 DIAGNOSIS — D18.01 HEMANGIOMA OF SKIN: ICD-10-CM

## 2025-04-17 DIAGNOSIS — L30.9 DERMATITIS: ICD-10-CM

## 2025-04-17 PROCEDURE — 99214 OFFICE O/P EST MOD 30 MIN: CPT | Performed by: DERMATOLOGY

## 2025-04-17 PROCEDURE — 11311 SHAVE SKIN LESION 0.6-1.0 CM: CPT | Performed by: DERMATOLOGY

## 2025-04-17 PROCEDURE — 17004 DESTROY PREMAL LESIONS 15/>: CPT | Performed by: DERMATOLOGY

## 2025-04-17 PROCEDURE — 1159F MED LIST DOCD IN RCRD: CPT | Performed by: DERMATOLOGY

## 2025-04-17 ASSESSMENT — ITCH NUMERIC RATING SCALE: HOW SEVERE IS YOUR ITCHING?: 4

## 2025-04-17 NOTE — Clinical Note
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  No treatment is medically indicated for these lesions at this time.

## 2025-04-17 NOTE — Clinical Note
History of nonmelanoma skin cancers and actinic keratoses and photodamage.  There is no evidence of recurrence at the sites of the patient's previously treated NMSCs on exam today.  The signs and symptoms of skin cancer were reviewed and the patient was advised to practice sun protection and sun avoidance, use daily sunscreen, and perform regular self skin exams.  I will have the patient return to our office in 4-6 months, pending the above biopsy results, for routine follow-up and skin exam, and the patient was instructed to call our office should the patient notice any new, changing, symptomatic, or otherwise concerning skin lesions before then.  The patient expressed understanding and is in agreement with this plan.

## 2025-04-17 NOTE — Clinical Note
On the patient's left lateral proximal dorsal hand, right lateral distal arm, and scattered on his face, neck, trunk, and extremities, there are well-healed scars with no evidence of recurrent growth on exam today.

## 2025-04-17 NOTE — Clinical Note
Scattered on the patient's face, neck, trunk, and extremities, there are multiple small, round, cherry red- to purplish-colored, symmetric, uniform, vascular-appearing macules and papules

## 2025-04-17 NOTE — Clinical Note
Photodamage.  The signs and symptoms of skin cancer were reviewed and the patient was advised to practice sun protection and sun avoidance, use daily sunscreen, and perform regular self skin exams.  Sun protection was discussed, including avoiding the mid-day sun, wearing a sunscreen with SPF at least 50, and stressing the need for reapplication of sunscreen and applying more than they think they need.

## 2025-04-17 NOTE — Clinical Note
Seborrheic Keratoses - the benign nature of these lesions was discussed with the patient today and reassurance provided.  No treatment is medically indicated for these lesions at this time.

## 2025-04-17 NOTE — Clinical Note
Scattered on the patient's face, neck, trunk, and extremities, there are several small, round- to oval-shaped, brown-pigmented and pink-colored, symmetric, uniform-appearing macules and dome-shaped papules

## 2025-04-17 NOTE — Clinical Note
Diffuse photodamage with actinic changes with telangiectasia and mottled pigmentation in sun-exposed areas.

## 2025-04-17 NOTE — Clinical Note
On his bilateral legs, there are several similar-appearing ill-defined erythematous, scaly, slightly lichenified patches and thin plaques, some with a dry or cracked riverbed appearance

## 2025-04-17 NOTE — Clinical Note
Scattered on the patient's face, neck, trunk, and extremities, there are multiple tan- to light brown-colored, hyperkeratotic, stuck-on appearing papules of varying size and shape

## 2025-04-17 NOTE — Clinical Note
Clinically benign- to slightly atypical-appearing nevi - the clinically benign- to slightly atypical-appearing nature of the patient's nevi was discussed with the patient today.  None of the patient's nevi meet threshold for biopsy today.  I emphasized the importance of performing monthly self-skin exams using the ABCDs of monitoring moles, which were reviewed with the patient today and an informational hand-out provided.  I also emphasized the importance of sun avoidance and sun protection with daily sunscreen use.  The patient expressed understanding and is in agreement with this plan.

## 2025-04-17 NOTE — Clinical Note
Eczema, possibly asteatotic dermatitis - flare on bilateral legs.  The potentially chronic and intermittently flaring nature of this condition and treatment options were discussed extensively with the patient today.  At this time, I recommend topical steroid therapy with Triamcinolone 0.025% cream, which the patient was instructed to apply twice daily to the affected areas of his legs (avoid face, groin, body folds) for the next 2 weeks, followed by taper to twice daily on weekends only for persistent areas; the patient may repeat treatment in a 2-week burst-and-taper fashion every 6-8 weeks as needed for future flares.  I also emphasized the importance of dry, sensitive skin care, including the use of a mild soap, such as Dove, and frequent and aggressive moisturization, at least twice daily and immediately following showers or baths, with recommended over-the-counter moisturizing creams, such as Eucerin, Cetaphil, Cerave, or Aveeno, or Vaseline or Aquaphor ointments.  The risks, benefits, and side effects of this medication, including possible skin atrophy with overuse of topical steroids, were discussed.  The patient expressed understanding and is in agreement with this plan.

## 2025-04-17 NOTE — Clinical Note
Actinic Keratoses -scattered on face.  The pre-cancerous nature of these lesions and treatment options were discussed with the patient today.  At this time, I recommend treatment with liquid nitrogen cryotherapy.  The patient expressed understanding, is in agreement with this plan, and wishes to proceed with cryotherapy today.

## 2025-04-17 NOTE — PROGRESS NOTES
Subjective     Bryson Orourke is a 80 y.o. male who presents for the following: Skin Check.  He notes a few new scaly bumps in his left eyebrow and right cheek, which have been present for a few months, have increased in size, and hurt to touch.  He also notes red, dry, itchy areas on his legs.  He denies any other new, changing, or concerning skin lesions since his last visit; no bleeding, itching, or burning lesions.      Review of Systems:  No other skin or systemic complaints other than what is documented elsewhere in the note.    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 Sanchez on 5/22/24  - BCC on right lateral distal arm diagnosed on 10/4/21 s/p ED&C on 11/11/21  - numerous AKs s/p course of topical field therapy with Efudex 5% cream on his face and scalp completed in December 2019  - Tinea pedis  - allergic contact dermatitis to poison ivy with auto-eczematization  - seborrheic dermatitis  - no h/o melanoma    Family History:    Sister - nonmelanoma skin cancer  No family history of melanoma    Social History:    The patient works as a  and cabrera and draws on Fridays    Allergies:  Acyclovir, Diphenhydramine, Escitalopram, Famciclovir, Penicillin, Penicillins, and Prednisone    Current Medications / CAM's:  Current Medications[1]     Objective   Well appearing patient in no apparent distress; mood and affect are within normal limits.    A full examination was performed including scalp, face, eyes, ears,  nose, lips, neck, chest, axillae, abdomen, back, bilateral upper extremities, and bilateral lower extremities. All findings within normal limits unless otherwise noted below.    Assessment/Plan   Skin Exam  1. NEOPLASM OF UNCERTAIN BEHAVIOR OF SKIN (3)  Right Mandibular Angle  6 mm erythematous, scaly papule     Shave removal    Lesion length (cm):  0.6  Margin per side (cm):  0  Lesion diameter (cm):  0.6  Informed consent: discussed and consent obtained    Timeout: patient name, date of birth, surgical site, and procedure verified    Procedure prep:  Patient was prepped and draped  Anesthesia: the lesion was anesthetized in a standard fashion    Anesthetic:  1% lidocaine w/ epinephrine 1-100,000 local infiltration  Instrument used: flexible razor blade    Hemostasis achieved with: aluminum chloride    Outcome: patient tolerated procedure well    Post-procedure details: sterile dressing applied and wound care instructions given    Dressing type: bandage and petrolatum      Staff Communication: Dermatology Local Anesthesia: 1 % Lidocaine / Epinephrine - Amount:0.5ml  Specimen 1 - Dermatopathology- DERM LAB  Differential Diagnosis: HAK v SCCIS  Check Margins Yes/No?:    Comments:    Dermpath Lab: Routine Histopathology (formalin-fixed tissue)  Left Lateral Eyebrow Medial  6 mm erythematous, scaly papule     Shave removal    Lesion length (cm):  0.6  Margin per side (cm):  0  Lesion diameter (cm):  0.6  Informed consent: discussed and consent obtained    Timeout: patient name, date of birth, surgical site, and procedure verified    Procedure prep:  Patient was prepped and draped  Anesthesia: the lesion was anesthetized in a standard fashion    Anesthetic:  1% lidocaine w/ epinephrine 1-100,000 local infiltration  Instrument used: flexible razor blade    Hemostasis achieved with: aluminum chloride    Outcome: patient tolerated procedure well    Post-procedure details: sterile dressing applied and wound care  instructions given    Dressing type: bandage and petrolatum      Staff Communication: Dermatology Local Anesthesia: 1 % Lidocaine / Epinephrine - Amount:0.5ml  Specimen 2 - Dermatopathology- DERM LAB  Differential Diagnosis: HAK v SCCIS  Check Margins Yes/No?:    Comments:    Dermpath Lab: Routine Histopathology (formalin-fixed tissue)  Left Lateral Eyebrow Lateral  6 mm erythematous, scaly papule     Shave removal    Lesion length (cm):  0.6  Margin per side (cm):  0  Lesion diameter (cm):  0.6  Informed consent: discussed and consent obtained    Timeout: patient name, date of birth, surgical site, and procedure verified    Procedure prep:  Patient was prepped and draped  Anesthesia: the lesion was anesthetized in a standard fashion    Anesthetic:  1% lidocaine w/ epinephrine 1-100,000 local infiltration  Instrument used: flexible razor blade    Hemostasis achieved with: aluminum chloride    Outcome: patient tolerated procedure well    Post-procedure details: sterile dressing applied and wound care instructions given    Dressing type: bandage and petrolatum      Staff Communication: Dermatology Local Anesthesia: 1 % Lidocaine / Epinephrine - Amount:0.5ml  Specimen 3 - Dermatopathology- DERM LAB  Differential Diagnosis: HAK v SCCIS  Check Margins Yes/No?:    Comments:    Dermpath Lab: Routine Histopathology (formalin-fixed tissue)  2. ACTINIC KERATOSIS (24)  Head - Anterior (Face) (24)  Scattered on the patient's face, there are multiple erythematous, gritty, scaly macules   Actinic Keratoses -scattered on face.  The pre-cancerous nature of these lesions and treatment options were discussed with the patient today.  At this time, I recommend treatment with liquid nitrogen cryotherapy.  The patient expressed understanding, is in agreement with this plan, and wishes to proceed with cryotherapy today.  Destr of lesion - Head - Anterior (Face) (24)  Complexity: simple    Destruction method: cryotherapy    Informed  consent: discussed and consent obtained    Lesion destroyed using liquid nitrogen: Yes    Cryotherapy cycles:  1  Outcome: patient tolerated procedure well with no complications    Post-procedure details: wound care instructions given    3. MELANOCYTIC NEVUS OF TRUNK  Generalized  Scattered on the patient's face, neck, trunk, and extremities, there are several small, round- to oval-shaped, brown-pigmented and pink-colored, symmetric, uniform-appearing macules and dome-shaped papules  Clinically benign- to slightly atypical-appearing nevi - the clinically benign- to slightly atypical-appearing nature of the patient's nevi was discussed with the patient today.  None of the patient's nevi meet threshold for biopsy today.  I emphasized the importance of performing monthly self-skin exams using the ABCDs of monitoring moles, which were reviewed with the patient today and an informational hand-out provided.  I also emphasized the importance of sun avoidance and sun protection with daily sunscreen use.  The patient expressed understanding and is in agreement with this plan.  4. SEBORRHEIC KERATOSIS  Generalized  Scattered on the patient's face, neck, trunk, and extremities, there are multiple tan- to light brown-colored, hyperkeratotic, stuck-on appearing papules of varying size and shape  Seborrheic Keratoses - the benign nature of these lesions was discussed with the patient today and reassurance provided.  No treatment is medically indicated for these lesions at this time.  5. HEMANGIOMA OF SKIN  Generalized  Scattered on the patient's face, neck, trunk, and extremities, there are multiple small, round, cherry red- to purplish-colored, symmetric, uniform, vascular-appearing macules and papules  Cherry Angiomas - the benign nature of these vascular lesions was discussed with the patient today and reassurance provided.  No treatment is medically indicated for these lesions at this time.  6. DERMATITIS  Right Lower Leg -  Anterior  On his bilateral legs, there are several similar-appearing ill-defined erythematous, scaly, slightly lichenified patches and thin plaques, some with a dry or cracked riverbed appearance  Eczema, possibly asteatotic dermatitis - flare on bilateral legs.  The potentially chronic and intermittently flaring nature of this condition and treatment options were discussed extensively with the patient today.  At this time, I recommend topical steroid therapy with Triamcinolone 0.025% cream, which the patient was instructed to apply twice daily to the affected areas of his legs (avoid face, groin, body folds) for the next 2 weeks, followed by taper to twice daily on weekends only for persistent areas; the patient may repeat treatment in a 2-week burst-and-taper fashion every 6-8 weeks as needed for future flares.  I also emphasized the importance of dry, sensitive skin care, including the use of a mild soap, such as Dove, and frequent and aggressive moisturization, at least twice daily and immediately following showers or baths, with recommended over-the-counter moisturizing creams, such as Eucerin, Cetaphil, Cerave, or Aveeno, or Vaseline or Aquaphor ointments.  The risks, benefits, and side effects of this medication, including possible skin atrophy with overuse of topical steroids, were discussed.  The patient expressed understanding and is in agreement with this plan.  7. HISTORY OF NONMELANOMA SKIN CANCER  Generalized  On the patient's left lateral proximal dorsal hand, right lateral distal arm, and scattered on his face, neck, trunk, and extremities, there are well-healed scars with no evidence of recurrent growth on exam today.  History of nonmelanoma skin cancers and actinic keratoses and photodamage.  There is no evidence of recurrence at the sites of the patient's previously treated NMSCs on exam today.  The signs and symptoms of skin cancer were reviewed and the patient was advised to practice sun  protection and sun avoidance, use daily sunscreen, and perform regular self skin exams.  I will have the patient return to our office in 4-6 months, pending the above biopsy results, for routine follow-up and skin exam, and the patient was instructed to call our office should the patient notice any new, changing, symptomatic, or otherwise concerning skin lesions before then.  The patient expressed understanding and is in agreement with this plan.  8. DIFFUSE PHOTODAMAGE OF SKIN  Photodistributed  Diffuse photodamage with actinic changes with telangiectasia and mottled pigmentation in sun-exposed areas.  Photodamage.  The signs and symptoms of skin cancer were reviewed and the patient was advised to practice sun protection and sun avoidance, use daily sunscreen, and perform regular self skin exams.  Sun protection was discussed, including avoiding the mid-day sun, wearing a sunscreen with SPF at least 50, and stressing the need for reapplication of sunscreen and applying more than they think they need.          [1]   Current Outpatient Medications:     ketoconazole (NIZOral) 2 % cream, Apply thin layer twice daily both feet and in between the toes., Disp: 60 g, Rfl: 1    losartan (Cozaar) 25 mg tablet, Take 1 tablet (25 mg) by mouth once daily., Disp: 90 tablet, Rfl: 3

## 2025-05-16 ENCOUNTER — APPOINTMENT (OUTPATIENT)
Dept: DERMATOLOGY | Facility: CLINIC | Age: 80
End: 2025-05-16
Payer: MEDICARE

## 2025-06-11 ASSESSMENT — DERMATOLOGY QUALITY OF LIFE (QOL) ASSESSMENT
RATE HOW BOTHERED YOU ARE BY EFFECTS OF YOUR SKIN PROBLEMS ON YOUR ACTIVITIES (EG, GOING OUT, ACCOMPLISHING WHAT YOU WANT, WORK ACTIVITIES OR YOUR RELATIONSHIPS WITH OTHERS): 2
RATE HOW BOTHERED YOU ARE BY SYMPTOMS OF YOUR SKIN PROBLEM (EG, ITCHING, STINGING BURNING, HURTING OR SKIN IRRITATION): 2
RATE HOW BOTHERED YOU ARE BY SYMPTOMS OF YOUR SKIN PROBLEM (EG, ITCHING, STINGING BURNING, HURTING OR SKIN IRRITATION): 2
RATE HOW EMOTIONALLY BOTHERED YOU ARE BY YOUR SKIN PROBLEM (FOR EXAMPLE, WORRY, EMBARRASSMENT, FRUSTRATION): 2

## 2025-06-13 ENCOUNTER — APPOINTMENT (OUTPATIENT)
Dept: DERMATOLOGY | Facility: CLINIC | Age: 80
End: 2025-06-13
Payer: MEDICARE

## 2025-06-13 DIAGNOSIS — D48.5 NEOPLASM OF UNCERTAIN BEHAVIOR OF SKIN: Primary | ICD-10-CM

## 2025-06-13 DIAGNOSIS — L82.1 SEBORRHEIC KERATOSIS: ICD-10-CM

## 2025-06-13 DIAGNOSIS — L57.0 ACTINIC KERATOSIS: ICD-10-CM

## 2025-06-13 DIAGNOSIS — L57.8 DIFFUSE PHOTODAMAGE OF SKIN: ICD-10-CM

## 2025-06-13 DIAGNOSIS — L81.4 LENTIGO: ICD-10-CM

## 2025-06-13 DIAGNOSIS — C44.92 SQUAMOUS CELL CARCINOMA OF SKIN: ICD-10-CM

## 2025-06-13 NOTE — Clinical Note
History of nonmelanoma skin cancers and actinic keratoses and photodamage.  The patient was recently seen in our office for routine follow-up and skin exam on 4/17/25, at which time no evidence of recurrence was noted.  The signs and symptoms of skin cancer were reviewed and the patient was advised to practice sun protection and sun avoidance, use daily sunscreen, and perform regular self skin exams.  I will have the patient return to our office in 4-6 months from the date of his last visit, pending the above biopsy result, for routine follow-up and skin exam, and the patient was instructed to call our office should the patient notice any new, changing, symptomatic, or otherwise concerning skin lesions before then.  The patient expressed understanding and is in agreement with this plan.

## 2025-06-13 NOTE — Clinical Note
Biopsy-proven Actinic Keratoses and additional AKs - left lateral eyebrow medial, extending to the peripheral margins, and left lateral eyebrow lateral, focally bowenoid and extending to the deep and peripheral margins, and scattered on face, respectively,.  The pre-cancerous nature of these lesions and treatment options were discussed with the patient today.  At this time, I recommend treatment with liquid nitrogen cryotherapy.  The patient expressed understanding, is in agreement with this plan, and wishes to proceed with cryotherapy today.

## 2025-06-13 NOTE — Clinical Note
On the patient's left lateral eyebrow medial and left lateral eyebrow lateral, there are pink, well-healed scars at his recent biopsy sites each with a surrounding rim of erythema, grittiness, and scale; scattered on the patient's face, there are multiple erythematous, gritty, scaly macules

## 2025-06-13 NOTE — Clinical Note
Scattered on the patient's face, neck, and bilateral hands, there are several tan- to light brown-colored, hyperkeratotic, stuck-on appearing papules of varying size and shape

## 2025-06-13 NOTE — Clinical Note
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  The malignant nature of SCC, the need for further definitive treatment, and treatment options, including Mohs surgery, wide local excision, and Electrodesiccation & Curettage, were discussed extensively with the patient today.  After discussing the risks and benefits of each option, including the differences in cure rates and permanent scar results, the patient expressed understanding, and I again recommend referral to our Dermatologic surgery unit for definitive treatment of this SCC, likely with Mohs surgery.  The patient expressed understanding, is in agreement with this plan, and states he already scheduled his Mohs surgery to be performed in our office by Dr. Sanchez on 6/18/25.

## 2025-06-14 NOTE — PROGRESS NOTES
Subjective     Bryson Orourke is a 80 y.o. male who presents for the following: Discuss recent biopsy results and management options.  Biopsy of 3 suspicious lesions performed at his last visit in our office on 4/17/25 revealed a squamous cell carcinoma on his right mandibular angle and actinic keratoses on his left lateral eyebrow medial and left lateral eyebrow lateral.    Today, the patient states the biopsy sites healed well.  Since his last visit, he reports he has developed a new scaly bump on the top of his right hand, which has been present for 1 month and has increased in size and itches.  It has not changed in any other way, including in shape or color, and it does not hurt or bleed.  He denies any other new, changing, or concerning skin lesions since his last visit; no bleeding, itching, or burning lesions.      Review of Systems:  No other skin or systemic complaints other than what is documented elsewhere in the note.    The following portions of the chart were reviewed this encounter and updated as appropriate:       Skin Cancer History  Biopsy Log Book  Biopsied Type Location Status   5/9/24 SCC Left Lateral Proximal Dorsal Hand Refer Mohs/Surgeon   4/17/25 SCC Right Mandibular Angle Refer Mohs/Surgeon       Additional History      Specialty Problems          Dermatology Problems    Eczema       Past Dermatologic / Past Relevant Medical History:    - history of several nonmelanoma skin cancers, including:  - most recently biopsy-proven SCC on right mandibular angle diagnosed at last visit on 4/17/25 and pending definitive treatment  - 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 Sanchez on 5/22/24  - BCC on right lateral distal arm diagnosed on 10/4/21 s/p ED&C on 11/11/21  - numerous AKs s/p course of topical field therapy with Efudex 5% cream on his face and scalp completed in December 2019  - Tinea pedis  - eczema  - allergic contact dermatitis to poison  ivy with auto-eczematization  - seborrheic dermatitis  - no h/o melanoma    Family History:    Sister - nonmelanoma skin cancer  No family history of melanoma    Social History:    The patient works as a  and cabrera and draws on Fridays    Allergies:  Acyclovir, Diphenhydramine, Escitalopram, Famciclovir, Penicillin, Penicillins, and Prednisone    Current Medications / CAM's:  Current Medications[1]     Objective   Well appearing patient in no apparent distress; mood and affect are within normal limits.    A skin examination was performed including: Face, neck, and bilateral hands. All findings within normal limits unless otherwise noted below.    Assessment/Plan   Skin Exam  1. NEOPLASM OF UNCERTAIN BEHAVIOR OF SKIN  Right Distal Dorsal Hand  8 mm erythematous, scaly papule     Shave removal    Lesion length (cm):  0.8  Margin per side (cm):  0  Lesion diameter (cm):  0.8  Informed consent: discussed and consent obtained    Timeout: patient name, date of birth, surgical site, and procedure verified    Procedure prep:  Patient was prepped and draped  Anesthesia: the lesion was anesthetized in a standard fashion    Anesthetic:  1% lidocaine w/ epinephrine 1-100,000 local infiltration  Instrument used: flexible razor blade    Hemostasis achieved with: aluminum chloride    Outcome: patient tolerated procedure well    Post-procedure details: sterile dressing applied and wound care instructions given    Dressing type: bandage and petrolatum      Staff Communication: Dermatology Local Anesthesia: 1 % Lidocaine / Epinephrine - Amount:0.5ml  Specimen 1 - Dermatopathology- DERM LAB  Differential Diagnosis: SCC v SK  Check Margins Yes/No?:    Comments:    Dermpath Lab: Routine Histopathology (formalin-fixed tissue)  2. 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  The malignant nature of SCC, the need for further definitive treatment, and treatment options, including Mohs surgery, wide local excision, and Electrodesiccation & Curettage, were discussed extensively with the patient today.  After discussing the risks and benefits of each option, including the differences in cure rates and permanent scar results, the patient expressed understanding, and I again recommend referral to our Dermatologic surgery unit for definitive treatment of this SCC, likely with Mohs surgery.  The patient expressed understanding, is in agreement with this plan, and states he already scheduled his Mohs surgery to be performed in our office by Dr. Sanchez on 6/18/25.  3. ACTINIC KERATOSIS (16)  Head - Anterior (Face) (16)  On the patient's left lateral eyebrow medial and left lateral eyebrow lateral, there are pink, well-healed scars at his recent biopsy sites each with a surrounding rim of erythema, grittiness, and scale; scattered on the patient's face, there are multiple erythematous, gritty, scaly macules  Biopsy-proven Actinic Keratoses and additional AKs - left lateral eyebrow medial, extending to the peripheral margins, and left lateral eyebrow lateral, focally bowenoid and extending to the deep and peripheral margins, and scattered on face, respectively,.  The pre-cancerous nature of these lesions and treatment options were discussed with the patient today.  At this time, I recommend treatment with liquid nitrogen cryotherapy.  The patient expressed understanding, is in agreement with this plan, and wishes to proceed with cryotherapy today.  Destr of lesion - Head - Anterior (Face) (16)  Complexity: simple    Destruction method: cryotherapy    Informed consent: discussed and consent obtained    Lesion destroyed using liquid nitrogen: Yes    Cryotherapy cycles:  1  Outcome: patient tolerated procedure well with no complications    Post-procedure  details: wound care instructions given    4. SEBORRHEIC KERATOSIS  Generalized  Scattered on the patient's face, neck, and bilateral hands, there are several tan- to light brown-colored, hyperkeratotic, stuck-on appearing papules of varying size and shape  Seborrheic Keratoses - the benign nature of these lesions was discussed with the patient today and reassurance provided.  No treatment is medically indicated for these lesions at this time.  5. LENTIGO  Photodistributed  Multiple tan- to light brown-colored, round- to oval-shaped, symmetric and uniform-appearing macules and small patches consistent with lentigines scattered in sun-exposed areas.  Solar Lentigines and photodamage.  The clinically benign-appearing nature of these lesions and their relation to chronic sun exposure were discussed with the patient today and reassurance provided.  None of these lesions meet threshold for biopsy today, and thus no treatment is medically indicated for these lesions at this time.  The signs and symptoms of skin cancer were reviewed and the patient was advised to practice sun protection and sun avoidance, use daily sunscreen, and perform regular self skin exams.  The patient was instructed to monitor these lesions for any changes, such as in size, shape, or color, or associated symptoms and to call our office to schedule a return visit for re-evaluation if any such changes or symptoms are noticed in the future.  The patient expressed understanding and is in agreement with this plan.  6. DIFFUSE PHOTODAMAGE OF SKIN  Photodistributed  Diffuse photodamage with actinic changes with telangiectasia and mottled pigmentation in sun-exposed areas.  History of nonmelanoma skin cancers and actinic keratoses and photodamage.  The patient was recently seen in our office for routine follow-up and skin exam on 4/17/25, at which time no evidence of recurrence was noted.  The signs and symptoms of skin cancer were reviewed and the patient  was advised to practice sun protection and sun avoidance, use daily sunscreen, and perform regular self skin exams.  I will have the patient return to our office in 4-6 months from the date of his last visit, pending the above biopsy result, for routine follow-up and skin exam, and the patient was instructed to call our office should the patient notice any new, changing, symptomatic, or otherwise concerning skin lesions before then.  The patient expressed understanding and is in agreement with this plan.          [1]   Current Outpatient Medications:     losartan (Cozaar) 25 mg tablet, Take 1 tablet (25 mg) by mouth once daily., Disp: 90 tablet, Rfl: 3    ketoconazole (NIZOral) 2 % cream, Apply thin layer twice daily both feet and in between the toes., Disp: 60 g, Rfl: 1

## 2025-06-18 ENCOUNTER — APPOINTMENT (OUTPATIENT)
Dept: DERMATOLOGY | Facility: CLINIC | Age: 80
End: 2025-06-18
Payer: MEDICARE

## 2025-06-18 VITALS — DIASTOLIC BLOOD PRESSURE: 76 MMHG | SYSTOLIC BLOOD PRESSURE: 124 MMHG | HEART RATE: 67 BPM

## 2025-06-18 DIAGNOSIS — C41.1 SQUAMOUS CELL CARCINOMA OF MANDIBLE: ICD-10-CM

## 2025-06-18 PROCEDURE — 13132 CMPLX RPR F/C/C/M/N/AX/G/H/F: CPT | Performed by: DERMATOLOGY

## 2025-06-18 PROCEDURE — 17311 MOHS 1 STAGE H/N/HF/G: CPT | Performed by: DERMATOLOGY

## 2025-06-18 PROCEDURE — 17312 MOHS ADDL STAGE: CPT | Performed by: DERMATOLOGY

## 2025-06-18 PROCEDURE — 99214 OFFICE O/P EST MOD 30 MIN: CPT | Performed by: DERMATOLOGY

## 2025-06-18 NOTE — PROGRESS NOTES
Office Visit Note  Date: 6/18/2025  Surgeon:  Isidoro Sanchez MD  Office Location: [address]   [facility] [address]  [facility] [address]  Dept: [phone]  Dept Fax: [phone]  Referring Provider: Guerrero Vasquez MD  [address] [place] [address]   [facility] [address]  [place] [address]    Subjective   Bryson Orourke is a 80 y.o. male who presents for the following: MOHS Surgery    According to the patient, the lesion has been present for approximately 1 month at the time of diagnosis.  The lesion is not causing symptoms.  The lesion has not been treated previously.    The patient does not have a pacemaker / defibrillator.  The patient does not have a heart valve / joint replacement.    The patient is not on blood thinners.  The patient does not have a history of hepatitis B or C.  The patient does not have a history of HIV.  The patient does not have a history of immunosuppression (e.g. organ transplantation, malignancy, medications)    Review of Systems:  No other skin or systemic complaints other than what is documented elsewhere in the note.    MEDICAL HISTORY: clinically relevant history including significant past medical history, medications and allergies was reviewed and documented in Epic.    Objective   Well appearing patient in no apparent distress; mood and affect are within normal limits.  Vital signs: See record.  Noted on the   right mandibular angle  Is a 0.6 x 0.5 cm scar    The patient confirmed the identified site.    Discussion:  The nature of the diagnosis was explained. The lesion is a skin cancer.  It has a risk of local growth and distant spread. The condition is associated with sun exposure.  Warning signs of non-melanoma skin cancer discussed. Patient was instructed to perform monthly self skin examination.  We recommended that the patient have regular full skin exams given an increased risk of subsequent skin cancers. The patient was instructed to use  sun protective behaviors including use of broad spectrum sunscreens and sun protective clothing to reduce risk of skin cancers.      Risks, benefits, side effects of Mohs surgery were discussed with patient and the patient voiced understanding.  It was explained that even though the cure rate of Mohs is very high it is not 100%. Risks of surgery including but not limited to bleeding, infection, numbness, nerve damage, and scar were reviewed.  Discussion included wound care requirements, activity restrictions, likely scar outcome and time to heal.     After Mohs surgery, the defect may need to be repaired surgically and the scar may be longer than the original lesion.  Reconstruction options, risks, and benefits were reviewed including second intention healing, linear repair (4-1 ratio was explained), local flaps, skin grafts, cartilage grafts and interpolation flaps (the need for multiple surgeries was explained). Possible outcomes were reviewed including likely scar appearance, failure of flap survival, infection, bleeding and the need for revision surgery.     The pathology was reviewed, the photograph was reviewed, and the referring physician's note was reviewed.    Patient elected for Mohs surgery.  The patient has a basal cell carcinoma.  The pathology was reviewed, the photograph was reviewed, and the referring physicians note was reviewed.  Multiple treatment options including mohs surgery (which has moderate risk of morbidity) were reviewed.    Medical Decision Making  Column 1- Basal Cell Carcinoma (1 acute uncomplicated illness- Low)  Column 2- 3 tests reviewed (pathology, photograph, refering physician notes- Moderate)  Column 3- Modertate risk of morbidity from additional treatment- mohs surgery- Moderate)    Overall Moderate CHAUNCEY     I, Keira Cuello RN   am scribing for, and in the presence of Isidoro Sanchez MD

## 2025-06-18 NOTE — PROGRESS NOTES
Mohs Surgery Operative Note    Date of Surgery:  6/18/2025  Surgeon:  Isidoro Sanchez MD  Office Location: [address] [address]  Dept: [phone]  Dept Fax: [phone]  Referring Provider: Guerrero Vasquez MD  [address] [place] [address] Dr Keon Tee Ward, [address] [address]      Assessment/Plan   Pre-procedure:   Obtained informed consent: written from patient  The surgical site was identified and confirmed with the patient.     Intra-operative:   Audible time out called at : 1:15PM 06/18/25  by: Keira Cuello RN   Verified patient name, birthdate, site, specimen bottle label & requisition.    The planned procedure(s) was again reviewed with the patient. The risks of bleeding, infection, nerve damage and scarring were reviewed. Written authorization was obtained. The patient identity, surgical site, and planned procedure(s) were verified. The provider acted as both surgeon and pathologist.     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  Mohs surgery    Consent obtained: written    Universal Protocol:  Procedure explained and questions answered to patient or proxy's satisfaction: Yes    Test results available and properly labeled: Yes    Pathology report reviewed: Yes    External notes reviewed: Yes    Photo or diagram used for site identification: Yes    Site/side marked: Yes    Slide independently reviewed by Mohs surgeon: Yes    Immediately prior to procedure a time out was called: Yes    Patient identity confirmed: verbally with patient  Preparation: Patient was prepped and draped in usual sterile fashion      Anticoagulation:  Is the patient taking prescription anticoagulant and/or aspirin prescribed/recommended by a physician? No    Was the anticoagulation regimen changed prior to Mohs? No      Anesthesia:  Anesthesia method: local infiltration  Local anesthetic: lidocaine 1% WITH epi    Procedure Details:  Case ID  Number: [id number]  Biopsy accession number: [id number]  Date of biopsy: 4/17/2025  Frozen section biopsy performed: No    Specimen debulked: No    Pre-Op diagnosis: squamous cell carcinoma  SCC subtype: invasive.  Surgical site (from skin exam): right mandibular angle  Pre-operative length (cm): 0.6  Pre-operative width (cm): 0.5  Indications for Mohs surgery: anatomic location where tissue conservation is critical  Previously treated? No      Micrographic Surgery Details:  Post-operative length (cm): 1.8  Post-operative width (cm): 1.2  Number of Mohs stages: 2    Stage 1     Comments: The patient was brought into the operating room and placed in the procedure chair in the appropriate position.  The area positive by previous biopsy was identified and confirmed with the patient. The area of clinically obvious tumor was debulked using a curette and/or scalpel as needed. An incision was made following the Mohs approach through the skin. The specimen was taken to the lab, divided into 2 piece(s) and appropriately chromacoded and processed.    Tumor was seen on both the lateral and deep margins as indicated on the on the Mohs map.  Squamous cell carcinoma in situ. Histologic examination revealed enlarged, atypical keratinocytes with large nuclear to cytoplasmic ratio extending throughout the full thickness of an epidermis.            Tumor features identified on Mohs section: Squamous Cell Carcinoma in Situ      Depth of invasion: Epidermis    Stage 2     Comments: The area of positivity as noted on the Mohs map in the previous stage was identified and removed using the Mohs technique. The specimen was taken to the lab and appropriately chromacoded and processed in 1 piece(s).               Tumor features identified on Mohs section: no tumor identified    Depth of defect: subcutaneous fat    Patient tolerance of procedure: tolerated well, no immediate complications    Reconstruction:  Was the defect reconstructed? Yes     Was reconstruction performed by the same Mohs surgeon? Yes    Setting of reconstruction: outpatient office  When was reconstruction performed? same day  Type of reconstruction: linear  Linear reconstruction: complex  Length of linear repair (cm): 4  Subcutaneous Layers (Deep Stitches)   Suture size:  5-0  Suture type:  Vicryl  Stitches:  Buried vertical mattress  Fine/surface layer approximation (top stitches)   Epidermal/Superficial suture size:  5-0  Epidermal/Superficial suture type:  Fast-absorbing gut  Stitches: simple interrupted and simple running    Hemostasis achieved with: electrodesiccation  Outcome: patient tolerated procedure well with no complications    Post-procedure details: sterile dressing applied and wound care instructions given    Dressing type: pressure dressing      Complex Linear Repair - Wide Undermining:  Given the location and size of the defect, it was determined that a complex layered linear closure was required to restore normal anatomy and function. The repair was considered complex because extensive undermining was required and performed. The amount of undermining performed was greater than the maximum width of the defect as measured perpendicular to the closure line along at least one entire edge of the defect. Standing cutaneous cones were removed using Burow's triangles. The wound edges were brought into close approximation with buried vertical mattress sutures. The remainder of the wound was then closed with epidermal top sutures.        The final repair measured 4.0 cm                Wound care was discussed, and the patient was given written post-operative wound care instructions.      The patient will follow up with Isidoro Sanchez MD as needed for any post operative problems or concerns, and will follow up with their primary dermatologist as scheduled.       Aamir GARCIA am scribing for, and in the presence of Isidoro Sanchez MD

## 2025-07-24 ENCOUNTER — HOSPITAL ENCOUNTER (OUTPATIENT)
Dept: RADIOLOGY | Facility: CLINIC | Age: 80
Discharge: HOME | End: 2025-07-24
Payer: MEDICARE

## 2025-07-24 ENCOUNTER — APPOINTMENT (OUTPATIENT)
Dept: PRIMARY CARE | Facility: CLINIC | Age: 80
End: 2025-07-24
Payer: MEDICARE

## 2025-07-24 VITALS
SYSTOLIC BLOOD PRESSURE: 113 MMHG | WEIGHT: 190.2 LBS | BODY MASS INDEX: 25.8 KG/M2 | HEART RATE: 68 BPM | TEMPERATURE: 96.6 F | DIASTOLIC BLOOD PRESSURE: 72 MMHG

## 2025-07-24 DIAGNOSIS — G89.29 CHRONIC MIDLINE LOW BACK PAIN, UNSPECIFIED WHETHER SCIATICA PRESENT: ICD-10-CM

## 2025-07-24 DIAGNOSIS — M54.50 CHRONIC MIDLINE LOW BACK PAIN, UNSPECIFIED WHETHER SCIATICA PRESENT: ICD-10-CM

## 2025-07-24 DIAGNOSIS — G57.93 NEUROPATHY OF BOTH FEET: ICD-10-CM

## 2025-07-24 DIAGNOSIS — G57.93 NEUROPATHY OF BOTH FEET: Primary | ICD-10-CM

## 2025-07-24 PROCEDURE — 3078F DIAST BP <80 MM HG: CPT | Performed by: INTERNAL MEDICINE

## 2025-07-24 PROCEDURE — G2211 COMPLEX E/M VISIT ADD ON: HCPCS | Performed by: INTERNAL MEDICINE

## 2025-07-24 PROCEDURE — 72110 X-RAY EXAM L-2 SPINE 4/>VWS: CPT

## 2025-07-24 PROCEDURE — 1036F TOBACCO NON-USER: CPT | Performed by: INTERNAL MEDICINE

## 2025-07-24 PROCEDURE — 1126F AMNT PAIN NOTED NONE PRSNT: CPT | Performed by: INTERNAL MEDICINE

## 2025-07-24 PROCEDURE — 99214 OFFICE O/P EST MOD 30 MIN: CPT | Performed by: INTERNAL MEDICINE

## 2025-07-24 PROCEDURE — 3074F SYST BP LT 130 MM HG: CPT | Performed by: INTERNAL MEDICINE

## 2025-07-24 PROCEDURE — 72110 X-RAY EXAM L-2 SPINE 4/>VWS: CPT | Performed by: RADIOLOGY

## 2025-07-24 ASSESSMENT — ENCOUNTER SYMPTOMS
PALPITATIONS: 0
COUGH: 0
LOSS OF SENSATION IN FEET: 0
OCCASIONAL FEELINGS OF UNSTEADINESS: 0
CHILLS: 0
SHORTNESS OF BREATH: 0
FEVER: 0
POLYDIPSIA: 0
DEPRESSION: 0

## 2025-07-24 ASSESSMENT — PAIN SCALES - GENERAL: PAINLEVEL_OUTOF10: 0-NO PAIN

## 2025-07-24 NOTE — PROGRESS NOTES
Subjective   Patient ID: Bryson Orourke is a 80 y.o. male who presents for Follow-up.    Mental health remains stable, continues with volunteering and outside interests which serve to help him maintain his health here.     BP stable on medication as currently prescribed.     Chronic hx of bilateral foot numbness and pain. No prior EMG. Hx of low back pain chronic. Midline. No prior injury.          Review of Systems   Constitutional:  Negative for chills and fever.   Respiratory:  Negative for cough and shortness of breath.    Cardiovascular:  Negative for chest pain and palpitations.   Endocrine: Negative for polydipsia and polyuria.       Objective   /72 (BP Location: Left arm, Patient Position: Sitting, BP Cuff Size: Adult)   Pulse 68   Temp 35.9 °C (96.6 °F) (Temporal)   Wt 86.3 kg (190 lb 3.2 oz)   BMI 25.80 kg/m²     Physical Exam  Constitutional:       Appearance: Normal appearance.   HENT:      Head: Normocephalic and atraumatic.     Eyes:      Extraocular Movements: Extraocular movements intact.      Pupils: Pupils are equal, round, and reactive to light.     Neck:      Thyroid: No thyroid mass or thyromegaly.      Vascular: No carotid bruit.     Cardiovascular:      Rate and Rhythm: Normal rate and regular rhythm.      Heart sounds: No murmur heard.     No friction rub. No gallop.   Pulmonary:      Effort: No respiratory distress.      Breath sounds: No wheezing, rhonchi or rales.     Musculoskeletal:      Cervical back: Neck supple.      Right lower leg: No edema.      Left lower leg: No edema.   Lymphadenopathy:      Cervical: No cervical adenopathy.     Neurological:      Mental Status: He is alert.         Assessment/Plan   Assessment & Plan  Neuropathy of both feet    Orders:    EMG & nerve conduction; Future    Chronic midline low back pain, unspecified whether sciatica present    Orders:    EMG & nerve conduction; Future

## 2025-08-08 ENCOUNTER — HOSPITAL ENCOUNTER (OUTPATIENT)
Dept: NEUROLOGY | Facility: CLINIC | Age: 80
Discharge: HOME | End: 2025-08-08
Payer: MEDICARE

## 2025-08-08 DIAGNOSIS — G57.93 NEUROPATHY OF BOTH FEET: ICD-10-CM

## 2025-08-08 DIAGNOSIS — M54.50 CHRONIC MIDLINE LOW BACK PAIN, UNSPECIFIED WHETHER SCIATICA PRESENT: ICD-10-CM

## 2025-08-08 DIAGNOSIS — G89.29 CHRONIC MIDLINE LOW BACK PAIN, UNSPECIFIED WHETHER SCIATICA PRESENT: ICD-10-CM

## 2025-08-08 PROCEDURE — 95886 MUSC TEST DONE W/N TEST COMP: CPT | Performed by: PSYCHIATRY & NEUROLOGY

## 2025-08-08 PROCEDURE — 95913 NRV CNDJ TEST 13/> STUDIES: CPT | Performed by: PSYCHIATRY & NEUROLOGY

## 2025-08-13 DIAGNOSIS — G62.89 OTHER POLYNEUROPATHY: Primary | ICD-10-CM

## 2025-08-13 DIAGNOSIS — R94.6 ABNORMAL THYROID FUNCTION TEST: ICD-10-CM

## 2025-08-13 DIAGNOSIS — R73.9 HYPERGLYCEMIA: ICD-10-CM

## 2025-09-18 ENCOUNTER — APPOINTMENT (OUTPATIENT)
Dept: DERMATOLOGY | Facility: CLINIC | Age: 80
End: 2025-09-18
Payer: MEDICARE